# Patient Record
Sex: MALE | Race: WHITE | NOT HISPANIC OR LATINO | Employment: OTHER | URBAN - METROPOLITAN AREA
[De-identification: names, ages, dates, MRNs, and addresses within clinical notes are randomized per-mention and may not be internally consistent; named-entity substitution may affect disease eponyms.]

---

## 2017-02-02 ENCOUNTER — HISTORICAL (OUTPATIENT)
Dept: ADMINISTRATIVE | Facility: HOSPITAL | Age: 52
End: 2017-02-02

## 2017-02-02 LAB — GLUCOSE SERPL-MCNC: 99 MG/DL (ref 70–99)

## 2017-02-03 LAB
BUN SERPL-MCNC: 12 MG/DL (ref 8–20)
CALCIUM SERPL-MCNC: 9.3 MG/DL (ref 7.7–10.4)
CHLORIDE: 99 MMOL/L (ref 98–110)
CO2 SERPL-SCNC: 31 MMOL/L (ref 22.8–31.6)
CREATININE: 0.97 MG/DL (ref 0.6–1.4)
GLUCOSE: 94 MG/DL (ref 70–99)
INR PPP: 1
POTASSIUM SERPL-SCNC: 3.8 MMOL/L (ref 3.5–5)
PROTHROMBIN TIME: 12.8 SEC (ref 11.3–15.2)
SODIUM: 139 MMOL/L (ref 134–144)

## 2017-02-13 LAB
ALBUMIN SERPL-MCNC: 4.2 G/DL (ref 3.1–4.7)
ALP SERPL-CCNC: 78 IU/L (ref 40–104)
ALT (SGPT): 25 IU/L (ref 3–33)
AST SERPL-CCNC: 19 IU/L (ref 10–40)
BASOPHILS NFR BLD: 0 K/UL (ref 0–0.2)
BASOPHILS NFR BLD: 0.5 %
BILIRUB SERPL-MCNC: 0.7 MG/DL (ref 0.3–1)
BUN SERPL-MCNC: 18 MG/DL (ref 8–20)
CALCIUM SERPL-MCNC: 9.3 MG/DL (ref 7.7–10.4)
CHLORIDE: 102 MMOL/L (ref 98–110)
CO2 SERPL-SCNC: 29.9 MMOL/L (ref 22.8–31.6)
CREATININE: 0.96 MG/DL (ref 0.6–1.4)
EOSINOPHIL NFR BLD: 0.2 K/UL (ref 0–0.7)
EOSINOPHIL NFR BLD: 2.5 %
ERYTHROCYTE [DISTWIDTH] IN BLOOD BY AUTOMATED COUNT: 19.2 % (ref 12.5–14.5)
GLUCOSE: 93 MG/DL (ref 70–99)
GRAN #: 6.1 K/UL (ref 1.4–6.5)
GRAN%: 69.2 %
HCT VFR BLD AUTO: 32.9 % (ref 39–55)
HGB BLD-MCNC: 9 G/DL (ref 14–16)
IMMATURE GRANS (ABS): 0.1 K/UL (ref 0–1)
IMMATURE GRANULOCYTES: 1 %
LYMPH #: 1.6 K/UL (ref 1.2–3.4)
LYMPH%: 17.9 %
MCH RBC QN AUTO: 18.4 PG (ref 25–35)
MCHC RBC AUTO-ENTMCNC: 27.4 G/DL (ref 31–36)
MCV RBC AUTO: 67.4 FL (ref 80–100)
MONO #: 0.8 K/UL (ref 0.1–0.6)
MONO%: 8.9 %
NUCLEATED RBCS: 0 %
NUCLEATED RED BLOOD CELLS: 0 /100 WBC
PERFORMED BY:: ABNORMAL
PLATELET # BLD AUTO: 254 K/UL (ref 140–440)
PMV BLD AUTO: 9.2 FL (ref 7.4–10.4)
POTASSIUM SERPL-SCNC: 3.7 MMOL/L (ref 3.5–5)
PROT SERPL-MCNC: 7.7 G/DL (ref 6–8.2)
RBC # BLD AUTO: 4.88 M/UL (ref 4.3–5.9)
SODIUM: 139 MMOL/L (ref 134–144)
WBC # BLD: 8.8 K/UL (ref 5–10)

## 2017-02-27 LAB
ALBUMIN SERPL-MCNC: 4 G/DL (ref 3.1–4.7)
ALP SERPL-CCNC: 76 IU/L (ref 40–104)
ALT (SGPT): 22 IU/L (ref 3–33)
AST SERPL-CCNC: 17 IU/L (ref 10–40)
BASOPHILS NFR BLD: 0 K/UL (ref 0–0.2)
BASOPHILS NFR BLD: 0.5 %
BILIRUB SERPL-MCNC: 0.3 MG/DL (ref 0.3–1)
BUN SERPL-MCNC: 12 MG/DL (ref 8–20)
CALCIUM SERPL-MCNC: 9.1 MG/DL (ref 7.7–10.4)
CHLORIDE: 103 MMOL/L (ref 98–110)
CO2 SERPL-SCNC: 31.5 MMOL/L (ref 22.8–31.6)
CREATININE: 0.78 MG/DL (ref 0.6–1.4)
EOSINOPHIL NFR BLD: 0.2 K/UL (ref 0–0.7)
EOSINOPHIL NFR BLD: 3.7 %
ERYTHROCYTE [DISTWIDTH] IN BLOOD BY AUTOMATED COUNT: 20 % (ref 12.5–14.5)
GLUCOSE: 102 MG/DL (ref 70–99)
GRAN #: 3.9 K/UL (ref 1.4–6.5)
GRAN%: 68.8 %
HCT VFR BLD AUTO: 32.5 % (ref 39–55)
HGB BLD-MCNC: 9 G/DL (ref 14–16)
IMMATURE GRANS (ABS): 0 K/UL (ref 0–1)
IMMATURE GRANULOCYTES: 0.5 %
LYMPH #: 1 K/UL (ref 1.2–3.4)
LYMPH%: 18 %
MCH RBC QN AUTO: 18.8 PG (ref 25–35)
MCHC RBC AUTO-ENTMCNC: 27.7 G/DL (ref 31–36)
MCV RBC AUTO: 68 FL (ref 80–100)
MONO #: 0.5 K/UL (ref 0.1–0.6)
MONO%: 8.5 %
NUCLEATED RBCS: 0 %
NUCLEATED RED BLOOD CELLS: 0 /100 WBC
PERFORMED BY:: ABNORMAL
PLATELET # BLD AUTO: 178 K/UL (ref 140–440)
PMV BLD AUTO: 8.7 FL (ref 8.8–12.7)
POTASSIUM SERPL-SCNC: 3.8 MMOL/L (ref 3.5–5)
PROT SERPL-MCNC: 7.5 G/DL (ref 6–8.2)
RBC # BLD AUTO: 4.78 M/UL (ref 4.3–5.9)
SODIUM: 140 MMOL/L (ref 134–144)
WBC # BLD: 5.7 K/UL (ref 5–10)

## 2017-03-13 ENCOUNTER — HISTORICAL (OUTPATIENT)
Dept: ADMINISTRATIVE | Facility: HOSPITAL | Age: 52
End: 2017-03-13

## 2017-03-13 LAB
ALBUMIN SERPL-MCNC: 4.1 G/DL (ref 3.1–4.7)
ALP SERPL-CCNC: 84 IU/L (ref 40–104)
ALT (SGPT): 23 IU/L (ref 3–33)
AST SERPL-CCNC: 19 IU/L (ref 10–40)
BASOPHILS NFR BLD: 0 K/UL (ref 0–0.2)
BASOPHILS NFR BLD: 0.4 %
BILIRUB SERPL-MCNC: 0.4 MG/DL (ref 0.3–1)
BUN SERPL-MCNC: 9 MG/DL (ref 8–20)
CALCIUM SERPL-MCNC: 9.1 MG/DL (ref 7.7–10.4)
CHLORIDE: 105 MMOL/L (ref 98–110)
CO2 SERPL-SCNC: 27.5 MMOL/L (ref 22.8–31.6)
CREATININE: 0.86 MG/DL (ref 0.6–1.4)
EOSINOPHIL NFR BLD: 0.1 K/UL (ref 0–0.7)
EOSINOPHIL NFR BLD: 2.5 %
ERYTHROCYTE [DISTWIDTH] IN BLOOD BY AUTOMATED COUNT: 21.2 % (ref 12.5–14.5)
GLUCOSE: 110 MG/DL (ref 70–99)
GRAN #: 3.1 K/UL (ref 1.4–6.5)
GRAN%: 64.4 %
HCT VFR BLD AUTO: 33.3 % (ref 39–55)
HGB BLD-MCNC: 9.4 G/DL (ref 14–16)
IMMATURE GRANS (ABS): 0 K/UL (ref 0–1)
IMMATURE GRANULOCYTES: 0.4 %
LYMPH #: 1.1 K/UL (ref 1.2–3.4)
LYMPH%: 22.4 %
MCH RBC QN AUTO: 19.3 PG (ref 25–35)
MCHC RBC AUTO-ENTMCNC: 28.2 G/DL (ref 31–36)
MCV RBC AUTO: 68.4 FL (ref 80–100)
MONO #: 0.5 K/UL (ref 0.1–0.6)
MONO%: 9.9 %
NUCLEATED RBCS: 0 %
NUCLEATED RED BLOOD CELLS: 0 /100 WBC
PERFORMED BY:: ABNORMAL
PLATELET # BLD AUTO: 139 K/UL (ref 140–440)
PMV BLD AUTO: 8.3 FL (ref 8.8–12.7)
POTASSIUM SERPL-SCNC: 3.3 MMOL/L (ref 3.5–5)
PROT SERPL-MCNC: 7.5 G/DL (ref 6–8.2)
RBC # BLD AUTO: 4.87 M/UL (ref 4.3–5.9)
SODIUM: 140 MMOL/L (ref 134–144)
WBC # BLD: 4.9 K/UL (ref 5–10)

## 2017-04-10 LAB
ALBUMIN SERPL-MCNC: 3.7 G/DL (ref 3.1–4.7)
ALP SERPL-CCNC: 99 IU/L (ref 40–104)
ALT (SGPT): 14 IU/L (ref 3–33)
AST SERPL-CCNC: 18 IU/L (ref 10–40)
BASOPHILS NFR BLD: 0 K/UL (ref 0–0.2)
BASOPHILS NFR BLD: 0.6 %
BILIRUB SERPL-MCNC: 0.4 MG/DL (ref 0.3–1)
BUN SERPL-MCNC: 11 MG/DL (ref 8–20)
CALCIUM SERPL-MCNC: 9.3 MG/DL (ref 7.7–10.4)
CHLORIDE: 99 MMOL/L (ref 98–110)
CO2 SERPL-SCNC: 27.9 MMOL/L (ref 22.8–31.6)
CREATININE: 0.95 MG/DL (ref 0.6–1.4)
EOSINOPHIL NFR BLD: 0.2 K/UL (ref 0–0.7)
EOSINOPHIL NFR BLD: 2.7 %
ERYTHROCYTE [DISTWIDTH] IN BLOOD BY AUTOMATED COUNT: 21.7 % (ref 12.5–14.5)
GLUCOSE: 82 MG/DL (ref 70–99)
GRAN #: 4.8 K/UL (ref 1.4–6.5)
GRAN%: 66.6 %
HCT VFR BLD AUTO: 33 % (ref 39–55)
HGB BLD-MCNC: 9.3 G/DL (ref 14–16)
IMMATURE GRANS (ABS): 0.1 K/UL (ref 0–1)
IMMATURE GRANULOCYTES: 1 %
LYMPH #: 1.5 K/UL (ref 1.2–3.4)
LYMPH%: 20.4 %
MCH RBC QN AUTO: 20 PG (ref 25–35)
MCHC RBC AUTO-ENTMCNC: 28.2 G/DL (ref 31–36)
MCV RBC AUTO: 71 FL (ref 80–100)
MONO #: 0.6 K/UL (ref 0.1–0.6)
MONO%: 8.7 %
NUCLEATED RBCS: 0 %
NUCLEATED RED BLOOD CELLS: 0 /100 WBC
PERFORMED BY:: ABNORMAL
PLATELET # BLD AUTO: 194 K/UL (ref 140–440)
PMV BLD AUTO: 8.9 FL (ref 8.8–12.7)
POTASSIUM SERPL-SCNC: 3.9 MMOL/L (ref 3.5–5)
PROT SERPL-MCNC: 8.4 G/DL (ref 6–8.2)
RBC # BLD AUTO: 4.65 M/UL (ref 4.3–5.9)
SODIUM: 138 MMOL/L (ref 134–144)
WBC # BLD: 7.2 K/UL (ref 5–10)

## 2017-04-24 ENCOUNTER — HISTORICAL (OUTPATIENT)
Dept: ADMINISTRATIVE | Facility: HOSPITAL | Age: 52
End: 2017-04-24

## 2017-04-26 LAB
ALBUMIN SERPL-MCNC: 3.8 G/DL (ref 3.1–4.7)
ALP SERPL-CCNC: 75 IU/L (ref 40–104)
ALT (SGPT): 17 IU/L (ref 3–33)
AST SERPL-CCNC: 18 IU/L (ref 10–40)
BASOPHILS NFR BLD: 0 K/UL (ref 0–0.2)
BASOPHILS NFR BLD: 0.5 %
BILIRUB SERPL-MCNC: 0.3 MG/DL (ref 0.3–1)
BUN SERPL-MCNC: 12 MG/DL (ref 8–20)
CALCIUM SERPL-MCNC: 9 MG/DL (ref 7.7–10.4)
CHLORIDE: 103 MMOL/L (ref 98–110)
CO2 SERPL-SCNC: 28.8 MMOL/L (ref 22.8–31.6)
CREATININE: 0.93 MG/DL (ref 0.6–1.4)
EOSINOPHIL NFR BLD: 0.2 K/UL (ref 0–0.7)
EOSINOPHIL NFR BLD: 5.5 %
ERYTHROCYTE [DISTWIDTH] IN BLOOD BY AUTOMATED COUNT: 21.9 % (ref 12.5–14.5)
GLUCOSE: 127 MG/DL (ref 70–99)
GRAN #: 1.5 K/UL (ref 1.4–6.5)
GRAN%: 40.2 %
HCT VFR BLD AUTO: 29.5 % (ref 39–55)
HGB BLD-MCNC: 8.5 G/DL (ref 14–16)
IMMATURE GRANS (ABS): 0 K/UL (ref 0–1)
IMMATURE GRANULOCYTES: 0.8 %
LYMPH #: 1.5 K/UL (ref 1.2–3.4)
LYMPH%: 41.3 %
MCH RBC QN AUTO: 20.3 PG (ref 25–35)
MCHC RBC AUTO-ENTMCNC: 28.8 G/DL (ref 31–36)
MCV RBC AUTO: 70.4 FL (ref 80–100)
MONO #: 0.4 K/UL (ref 0.1–0.6)
MONO%: 11.7 %
NUCLEATED RBCS: 0 %
NUCLEATED RED BLOOD CELLS: 0 /100 WBC
PERFORMED BY:: ABNORMAL
PLATELET # BLD AUTO: 184 K/UL (ref 140–440)
PMV BLD AUTO: 8.5 FL (ref 8.8–12.7)
POTASSIUM SERPL-SCNC: 3 MMOL/L (ref 3.5–5)
PROT SERPL-MCNC: 7.5 G/DL (ref 6–8.2)
RBC # BLD AUTO: 4.19 M/UL (ref 4.3–5.9)
SODIUM: 138 MMOL/L (ref 134–144)
WBC # BLD: 3.7 K/UL (ref 5–10)

## 2017-05-08 LAB
ALBUMIN SERPL-MCNC: 3.7 G/DL (ref 3.1–4.7)
ALP SERPL-CCNC: 79 IU/L (ref 40–104)
ALT (SGPT): 17 IU/L (ref 3–33)
AST SERPL-CCNC: 14 IU/L (ref 10–40)
BASOPHILS NFR BLD: 0 K/UL (ref 0–0.2)
BASOPHILS NFR BLD: 0.4 %
BILIRUB SERPL-MCNC: 0.3 MG/DL (ref 0.3–1)
BUN SERPL-MCNC: 13 MG/DL (ref 8–20)
CALCIUM SERPL-MCNC: 9.2 MG/DL (ref 7.7–10.4)
CHLORIDE: 103 MMOL/L (ref 98–110)
CO2 SERPL-SCNC: 29.6 MMOL/L (ref 22.8–31.6)
CREATININE: 1.07 MG/DL (ref 0.6–1.4)
EOSINOPHIL NFR BLD: 0.1 K/UL (ref 0–0.7)
EOSINOPHIL NFR BLD: 1.3 %
ERYTHROCYTE [DISTWIDTH] IN BLOOD BY AUTOMATED COUNT: 21.2 % (ref 12.5–14.5)
GLUCOSE: 97 MG/DL (ref 70–99)
GRAN #: 2.9 K/UL (ref 1.4–6.5)
GRAN%: 61.7 %
HCT VFR BLD AUTO: 29.5 % (ref 39–55)
HGB BLD-MCNC: 8.6 G/DL (ref 14–16)
IMMATURE GRANS (ABS): 0 K/UL (ref 0–1)
IMMATURE GRANULOCYTES: 0.4 %
LYMPH #: 1.2 K/UL (ref 1.2–3.4)
LYMPH%: 26 %
MCH RBC QN AUTO: 20.9 PG (ref 25–35)
MCHC RBC AUTO-ENTMCNC: 29.2 G/DL (ref 31–36)
MCV RBC AUTO: 71.6 FL (ref 80–100)
MONO #: 0.5 K/UL (ref 0.1–0.6)
MONO%: 10.2 %
NUCLEATED RBCS: 0 %
NUCLEATED RED BLOOD CELLS: 0 /100 WBC
PERFORMED BY:: ABNORMAL
PLATELET # BLD AUTO: 142 K/UL (ref 140–440)
PMV BLD AUTO: 8.8 FL (ref 8.8–12.7)
POTASSIUM SERPL-SCNC: 4 MMOL/L (ref 3.5–5)
PROT SERPL-MCNC: 7 G/DL (ref 6–8.2)
RBC # BLD AUTO: 4.12 M/UL (ref 4.3–5.9)
SODIUM: 140 MMOL/L (ref 134–144)
WBC # BLD: 4.7 K/UL (ref 5–10)

## 2017-05-10 PROBLEM — C18.9 COLON CANCER: Status: ACTIVE | Noted: 2017-05-10

## 2017-05-10 PROBLEM — I82.C19 THROMBOSIS OF INTERNAL JUGULAR VEIN: Status: ACTIVE | Noted: 2017-05-10

## 2017-05-10 PROBLEM — I80.8 LEMIERRE SYNDROME: Status: ACTIVE | Noted: 2017-05-10

## 2017-05-10 PROBLEM — I26.99 ACUTE PULMONARY EMBOLISM: Status: ACTIVE | Noted: 2017-05-10

## 2017-05-10 PROBLEM — J02.9 LEMIERRE SYNDROME: Status: ACTIVE | Noted: 2017-05-10

## 2017-05-10 PROBLEM — R65.20 SEVERE SEPSIS: Status: ACTIVE | Noted: 2017-05-10

## 2017-05-10 PROBLEM — A41.9 SEVERE SEPSIS: Status: ACTIVE | Noted: 2017-05-10

## 2017-05-10 PROBLEM — D61.810 PANCYTOPENIA DUE TO ANTINEOPLASTIC CHEMOTHERAPY: Status: ACTIVE | Noted: 2017-05-10

## 2017-05-10 PROBLEM — T45.1X5A PANCYTOPENIA DUE TO ANTINEOPLASTIC CHEMOTHERAPY: Status: ACTIVE | Noted: 2017-05-10

## 2017-05-10 PROBLEM — E66.9 OBESITY (BMI 30-39.9): Status: ACTIVE | Noted: 2017-05-10

## 2017-05-11 PROBLEM — F17.200 TOBACCO USE DISORDER: Status: ACTIVE | Noted: 2017-05-11

## 2017-05-16 ENCOUNTER — OFFICE VISIT (OUTPATIENT)
Dept: HEMATOLOGY/ONCOLOGY | Facility: CLINIC | Age: 52
End: 2017-05-16
Payer: COMMERCIAL

## 2017-05-16 VITALS
HEART RATE: 82 BPM | WEIGHT: 278.81 LBS | TEMPERATURE: 98 F | SYSTOLIC BLOOD PRESSURE: 115 MMHG | DIASTOLIC BLOOD PRESSURE: 79 MMHG | BODY MASS INDEX: 37.76 KG/M2 | HEIGHT: 72 IN | RESPIRATION RATE: 16 BRPM

## 2017-05-16 DIAGNOSIS — D61.810 PANCYTOPENIA DUE TO ANTINEOPLASTIC CHEMOTHERAPY: ICD-10-CM

## 2017-05-16 DIAGNOSIS — C18.7 MALIGNANT NEOPLASM OF SIGMOID COLON: Primary | Chronic | ICD-10-CM

## 2017-05-16 DIAGNOSIS — R65.20 SEVERE SEPSIS: ICD-10-CM

## 2017-05-16 DIAGNOSIS — T45.1X5A PANCYTOPENIA DUE TO ANTINEOPLASTIC CHEMOTHERAPY: ICD-10-CM

## 2017-05-16 DIAGNOSIS — T45.1X5A NEUROPATHY DUE TO CHEMOTHERAPEUTIC DRUG: ICD-10-CM

## 2017-05-16 DIAGNOSIS — I26.99 OTHER ACUTE PULMONARY EMBOLISM WITHOUT ACUTE COR PULMONALE: ICD-10-CM

## 2017-05-16 DIAGNOSIS — G62.0 NEUROPATHY DUE TO CHEMOTHERAPEUTIC DRUG: ICD-10-CM

## 2017-05-16 DIAGNOSIS — I82.C11 THROMBOSIS OF INTERNAL JUGULAR VEIN, RIGHT: ICD-10-CM

## 2017-05-16 DIAGNOSIS — A41.9 SEVERE SEPSIS: ICD-10-CM

## 2017-05-16 PROCEDURE — 99215 OFFICE O/P EST HI 40 MIN: CPT | Mod: ,,, | Performed by: INTERNAL MEDICINE

## 2017-05-16 RX ORDER — SODIUM CHLORIDE 0.9 % (FLUSH) 0.9 %
10 SYRINGE (ML) INJECTION
Status: CANCELLED | OUTPATIENT
Start: 2017-06-05

## 2017-05-16 RX ORDER — PROMETHAZINE HYDROCHLORIDE 25 MG/1
25 TABLET ORAL EVERY 6 HOURS
Refills: 0 | COMMUNITY
Start: 2017-02-13 | End: 2018-07-17

## 2017-05-16 RX ORDER — HEPARIN 100 UNIT/ML
500 SYRINGE INTRAVENOUS
Status: CANCELLED | OUTPATIENT
Start: 2017-06-05

## 2017-05-16 RX ORDER — ONDANSETRON HYDROCHLORIDE 8 MG/1
8 TABLET, FILM COATED ORAL EVERY 8 HOURS
Refills: 0 | COMMUNITY
Start: 2017-02-13 | End: 2018-07-17

## 2017-05-16 NOTE — PROGRESS NOTES
PROGRESS NOTE    Subjective:       Patient ID: Faisal Aguirre III is a 51 y.o. male.    Chief Complaint:  Colon Cancer; Anemia; and Vascular Access Problem (recent port infection, admitted to [facility])      History of Present Illness:   Faisal Aguirre III is a 51 y.o. male who presents with a history of stage 3 colon cancer s/p 6th cycle of Folfox and developed pain on the right neck in port area.  Was sent to ED and transferred to [facility] for ENT eval.  Patient found to have right IJ thrombus, PE and required removal again of port.  Refuses placement of a new port.  Is now on Eliquis and antibiotics and feeling much better.         Family and Social history reviewed and is unchanged from last vist.       ROS:  Review of Systems   Constitutional: Negative for fever and unexpected weight change.   HENT: Negative for nosebleeds.    Respiratory: Negative for chest tightness and shortness of breath.    Cardiovascular: Negative for chest pain.   Gastrointestinal: Negative for abdominal pain and blood in stool.   Genitourinary: Negative for hematuria.   Skin: Negative for rash.   Hematological: Does not bruise/bleed easily.          Current Outpatient Prescriptions:     AMLODIPINE BESYLATE, BULK, MISC, Take 10 mg by mouth once daily at 6am. , Disp: , Rfl:     apixaban 5 mg Tab, Take 1 tablet (5 mg total) by mouth 2 (two) times daily., Disp: 60 tablet, Rfl: 0    clindamycin (CLEOCIN) 300 MG capsule, Take 1 capsule (300 mg total) by mouth every 8 (eight) hours., Disp: 15 capsule, Rfl: 0    dronabinol (MARINOL) 10 MG capsule, Take 10 mg by mouth 2 (two) times daily before meals., Disp: , Rfl:     finasteride (PROSCAR) 5 mg tablet, Take 5 mg by mouth once daily. , Disp: , Rfl:     furosemide (LASIX) 40 MG tablet, Take 40 mg by mouth once daily. , Disp: , Rfl:     lactobacillus acidophilus & bulgar (LACTINEX) 100 million cell packet, Take 1 packet (1 each  total) by mouth 2 (two) times daily., Disp: , Rfl:     omeprazole (PRILOSEC) 20 MG capsule, Take 20 mg by mouth once daily. , Disp: , Rfl:     ondansetron (ZOFRAN) 8 MG tablet, Take 8 mg by mouth every 8 (eight) hours., Disp: , Rfl: 0    potassium chloride (MICRO-K) 10 MEQ CpSR, Take 20 mEq by mouth once daily. , Disp: , Rfl:     promethazine (PHENERGAN) 25 MG tablet, Take 25 mg by mouth every 6 (six) hours., Disp: , Rfl: 0    tamsulosin (FLOMAX) 0.4 mg Cp24, Take 0.4 mg by mouth once daily. , Disp: , Rfl:     VENLAFAXINE HCL (VENLAFAXINE ORAL), Take 150 mg by mouth once daily at 6am. , Disp: , Rfl:         Objective:       Physical Examination:     /79  Pulse 82  Temp 97.8 °F (36.6 °C)  Resp 16  Ht 6' (1.829 m)  Wt 126.5 kg (278 lb 12.8 oz)  BMI 37.81 kg/m2    Physical Exam   Constitutional: He is oriented to person, place, and time. He appears well-developed and well-nourished.   HENT:   Head: Normocephalic and atraumatic.   Right Ear: External ear normal.   Left Ear: External ear normal.   Mouth/Throat: Oropharynx is clear and moist.   Eyes: Conjunctivae are normal. Pupils are equal, round, and reactive to light. No scleral icterus.   Neck: Normal range of motion. Neck supple.   Cardiovascular: Normal rate, regular rhythm and normal heart sounds.  Exam reveals no gallop and no friction rub.    No murmur heard.  Pulmonary/Chest: Effort normal and breath sounds normal. No respiratory distress. He has no rales. He exhibits no tenderness.   Abdominal: Soft. Bowel sounds are normal. He exhibits no distension and no mass. There is no hepatosplenomegaly. There is no tenderness. There is no rebound and no guarding.   Musculoskeletal: He exhibits no edema.   Lymphadenopathy:        Head (right side): No tonsillar adenopathy present.        Head (left side): No tonsillar adenopathy present.     He has no cervical adenopathy.     He has no axillary adenopathy.        Right: No supraclavicular adenopathy  present.        Left: No supraclavicular adenopathy present.   Neurological: He is alert and oriented to person, place, and time.   Psychiatric: He has a normal mood and affect. His behavior is normal. Judgment and thought content normal.   Vitals reviewed.      Labs:   Recent Results (from the past 336 hour(s))   CBC auto differential    Collection Time: 05/11/17  3:34 AM   Result Value Ref Range    WBC 2.56 (L) 3.90 - 12.70 K/uL    Hemoglobin 8.9 (L) 14.0 - 18.0 g/dL    Hematocrit 29.9 (L) 40.0 - 54.0 %    Platelets 131 (L) 150 - 350 K/uL   CBC auto differential    Collection Time: 05/10/17  8:32 PM   Result Value Ref Range    WBC 3.61 (L) 3.90 - 12.70 K/uL    Hemoglobin 9.0 (L) 14.0 - 18.0 g/dL    Hematocrit 30.3 (L) 40.0 - 54.0 %    Platelets 130 (L) 150 - 350 K/uL   CBC auto differential    Collection Time: 05/08/17  8:20 AM   Result Value Ref Range    WBC 4.7 (L) 5.0 - 10.0 K/ul    Hemoglobin 8.6 (L) 14.0 - 16.0 g/dl    Hematocrit 29.5 (L) 39.0 - 55.0 %    Platelets 142 140 - 440 K/ul     CMP  Sodium   Date Value Ref Range Status   05/11/2017 140 136 - 145 mmol/L Final   05/08/2017 140 134 - 144 mmol/L      Potassium   Date Value Ref Range Status   05/11/2017 4.3 3.5 - 5.1 mmol/L Final     Chloride   Date Value Ref Range Status   05/11/2017 104 95 - 110 mmol/L Final   05/08/2017 103 98 - 110 mmol/L      CO2   Date Value Ref Range Status   05/11/2017 25 22 - 31 mmol/L Final     Glucose   Date Value Ref Range Status   05/11/2017 158 (H) 70 - 110 mg/dL Final     Comment:     The ADA recommends the following guidelines for fasting glucose:  Normal:       less than 100 mg/dL  Prediabetes:  100 mg/dL to 125 mg/dL  Diabetes:     126 mg/dL or higher     05/08/2017 97 70 - 99 mg/dL      BUN, Bld   Date Value Ref Range Status   05/11/2017 8 (L) 9 - 21 mg/dL Final     Creatinine   Date Value Ref Range Status   05/11/2017 0.68 0.50 - 1.40 mg/dL Final   05/08/2017 1.07 0.60 - 1.40 mg/dL      Calcium   Date Value Ref Range  Status   05/11/2017 9.0 8.4 - 10.2 mg/dL Final     Total Protein   Date Value Ref Range Status   05/11/2017 7.4 6.0 - 8.4 g/dL Final     Albumin   Date Value Ref Range Status   05/11/2017 4.1 3.5 - 5.2 g/dL Final   05/08/2017 3.7 3.1 - 4.7 g/dL      Total Bilirubin   Date Value Ref Range Status   05/11/2017 0.6 0.2 - 1.3 mg/dL Final     Comment:     For infants and newborns, interpretation of results should be based  on gestational age, weight and in agreement with clinical  observations.  Premature Infant recommended reference ranges:  Up to 24 hours.............<8.0 mg/dL  Up to 48 hours............<12.0 mg/dL  3-5 days..................<15.0 mg/dL  6-29 days.................<15.0 mg/dL       Alkaline Phosphatase   Date Value Ref Range Status   05/11/2017 84 38 - 145 U/L Final     AST   Date Value Ref Range Status   05/11/2017 33 17 - 59 U/L Final     ALT   Date Value Ref Range Status   05/11/2017 50 (H) 10 - 44 U/L Final     Anion Gap   Date Value Ref Range Status   05/11/2017 11 8 - 16 mmol/L Final     eGFR if    Date Value Ref Range Status   05/11/2017 >60 >60 mL/min/1.73 m^2 Final     eGFR if non    Date Value Ref Range Status   05/11/2017 >60 >60 mL/min/1.73 m^2 Final     Comment:     Calculation used to obtain the estimated glomerular filtration  rate (eGFR) is the CKD-EPI equation. Since race is unknown   in our information system, the eGFR values for   -American and Non--American patients are given   for each creatinine result.       No results found for: CEA  No results found for: PSA        Assessment/Plan:   Malignant neoplasm of sigmoid colon  Comments:  Will d/e 5FU pump and replace with Xelox regimen for the next six cycles.  Discussed in detail with pt.  He refuses further port. Will begin June 5th.     Other acute pulmonary embolism without acute cor pulmonale  Comments:  Continue on Eliquis.  Patient doing well.  no sob.  improved breathing.     Severe  sepsis  Comments:  recovering.  continue antibiotics.      Thrombosis of internal jugular vein, right  Comments:  Continue Eliquis.     Pancytopenia due to antineoplastic chemotherapy  Comments:  Continue to observe for now.      Neuropathy due to chemotherapeutic drug  Comments:  low grade, watch closely.         Discussion:     I have spent greater than 40 minutes in the care of this patient discussing the issues reflected in the assessment and plan.  Greater than 50% face to face.  All questions answered to the patients satisfaction.    Electronically signed by Delvin Martinez

## 2017-05-16 NOTE — MR AVS SNAPSHOT
[facility]  [address]  [address]  [place] [address]  Phone: [phone]  Fax: [phone]                  Faisal Aguirre III   2017 9:15 AM   Office Visit    Description:  Male : 1965   Provider:  Delvin Rainey MD   Department:  [facility]           Reason for Visit     Colon Cancer     Anemia     Vascular Access Problem           Diagnoses this Visit        Comments    Malignant neoplasm of sigmoid colon    -  Primary Will d/e 5FU pump and replace with Xelox regimen for the next six cycles.  Discussed in detail with pt.  He refuses further port. Will begin .     Other acute pulmonary embolism without acute cor pulmonale     Continue on Eliquis.  Patient doing well.  no sob.  improved breathing.     Severe sepsis     recovering.  continue antibiotics.      Thrombosis of internal jugular vein, right     Continue Eliquis.     Pancytopenia due to antineoplastic chemotherapy     Continue to observe for now.      Neuropathy due to chemotherapeutic drug     low grade, watch closely.            To Do List           Future Appointments        Provider Department Dept Phone    2017 11:00 AM Delvin Rainey MD [facility] [phone]      Goals (5 Years of Data)     None      Follow-Up and Disposition     Return in about 5 weeks (around 2017).    Follow-up and Disposition History           Medications                Verify that the below list of medications is an accurate representation of the medications you are currently taking.  If none reported, the list may be blank. If incorrect, please contact your healthcare provider. Carry this list with you in case of emergency.           Current Medications     AMLODIPINE BESYLATE, BULK, MISC Take 10 mg by mouth once daily at 6am.     apixaban 5 mg Tab Take 1 tablet (5 mg total) by mouth 2 (two) times daily.    clindamycin (CLEOCIN) 300 MG  capsule Take 1 capsule (300 mg total) by mouth every 8 (eight) hours.    dronabinol (MARINOL) 10 MG capsule Take 10 mg by mouth 2 (two) times daily before meals.    finasteride (PROSCAR) 5 mg tablet Take 5 mg by mouth once daily.     furosemide (LASIX) 40 MG tablet Take 40 mg by mouth once daily.     lactobacillus acidophilus & bulgar (LACTINEX) 100 million cell packet Take 1 packet (1 each total) by mouth 2 (two) times daily.    omeprazole (PRILOSEC) 20 MG capsule Take 20 mg by mouth once daily.     ondansetron (ZOFRAN) 8 MG tablet Take 8 mg by mouth every 8 (eight) hours.    potassium chloride (MICRO-K) 10 MEQ CpSR Take 20 mEq by mouth once daily.     promethazine (PHENERGAN) 25 MG tablet Take 25 mg by mouth every 6 (six) hours.    tamsulosin (FLOMAX) 0.4 mg Cp24 Take 0.4 mg by mouth once daily.     VENLAFAXINE HCL (VENLAFAXINE ORAL) Take 150 mg by mouth once daily at 6am.            Clinical Reference Information           Your Vitals Were     BP Pulse Temp Resp Height Weight    115/79 82 97.8 °F (36.6 °C) 16 6' (1.829 m) 126.5 kg (278 lb 12.8 oz)    BMI                37.81 kg/m2          Blood Pressure          Most Recent Value    BP  115/79      Allergies as of 5/16/2017     Pcn [Penicillins]      Immunizations Administered on Date of Encounter - 5/16/2017     None      [facility] Sign UP     Activating your [facility] account is as easy as 1-2-3!     1) Visit [email or web address], select Sign Up Now, enter this activation code and your date of birth, then select Next.  [email or web address]  Expires: 6/27/2017 10:01 AM      2) Create a username and password to use when you visit [facility] in the future and select a security question in case you lose your password and select Next.    3) Enter your e-mail address and click Sign Up!    Additional Information  If you have questions, please  call [phone] to talk to our [facility] staff. Remember, [facility] is NOT to be used for urgent needs. For medical emergencies, dial  911.

## 2017-05-17 PROBLEM — E07.9 DISEASE OF THYROID GLAND: Status: ACTIVE | Noted: 2017-05-17

## 2017-05-17 PROBLEM — D37.8 NEOPLASM OF UNCERTAIN BEHAVIOR OF STOMACH, INTESTINES, AND RECTUM: Status: ACTIVE | Noted: 2017-05-17

## 2017-05-17 PROBLEM — D37.5 NEOPLASM OF UNCERTAIN BEHAVIOR OF STOMACH, INTESTINES, AND RECTUM: Status: ACTIVE | Noted: 2017-05-17

## 2017-05-17 PROBLEM — E78.5 HYPERLIPIDEMIA: Status: ACTIVE | Noted: 2017-05-17

## 2017-05-17 PROBLEM — D37.1 NEOPLASM OF UNCERTAIN BEHAVIOR OF STOMACH, INTESTINES, AND RECTUM: Status: ACTIVE | Noted: 2017-05-17

## 2017-05-17 PROBLEM — F31.9 BIPOLAR AFFECTIVE DISORDER: Status: ACTIVE | Noted: 2017-05-17

## 2017-05-17 PROBLEM — I10 HYPERTENSION: Status: ACTIVE | Noted: 2017-05-17

## 2017-05-17 PROBLEM — K21.9 GASTROESOPHAGEAL REFLUX DISEASE: Status: ACTIVE | Noted: 2017-05-17

## 2017-05-17 PROBLEM — E66.9 OBESITY WITH BODY MASS INDEX 30 OR GREATER: Status: ACTIVE | Noted: 2017-05-17

## 2017-05-17 PROBLEM — F17.200 CURRENT SMOKER: Status: ACTIVE | Noted: 2017-05-17

## 2017-05-17 RX ORDER — AMLODIPINE BESYLATE 10 MG/1
TABLET ORAL
COMMUNITY
Start: 2017-03-30

## 2017-05-17 RX ORDER — FINASTERIDE 5 MG/1
TABLET, FILM COATED ORAL
COMMUNITY
Start: 2017-03-30

## 2017-05-17 RX ORDER — VENLAFAXINE HYDROCHLORIDE 75 MG/1
CAPSULE, EXTENDED RELEASE ORAL
COMMUNITY
Start: 2017-04-27

## 2017-05-17 RX ORDER — SIMVASTATIN 40 MG/1
TABLET, FILM COATED ORAL
COMMUNITY
Start: 2017-03-30

## 2017-05-17 RX ORDER — DRONABINOL 5 MG/1
CAPSULE ORAL
COMMUNITY
Start: 2017-04-11 | End: 2018-07-17

## 2017-05-23 ENCOUNTER — TELEPHONE (OUTPATIENT)
Dept: HEMATOLOGY/ONCOLOGY | Facility: CLINIC | Age: 52
End: 2017-05-23

## 2017-05-23 NOTE — TELEPHONE ENCOUNTER
Ms jesse called in [facility] called said they received RX for this patient and has questions about the dosage please return her call.

## 2017-05-23 NOTE — TELEPHONE ENCOUNTER
Spoke with Dr. Martinez r/g rx sent to [facility] for pts.  Xeloda. # 40 was ordered and dose is 5 tabs BID X 14 days. Correct # should be 140. Corrected and faxed to [facility].

## 2017-06-05 ENCOUNTER — HISTORICAL (OUTPATIENT)
Dept: ADMINISTRATIVE | Facility: HOSPITAL | Age: 52
End: 2017-06-05

## 2017-06-05 LAB
ALBUMIN SERPL-MCNC: 4.2 G/DL (ref 3.1–4.7)
ALP SERPL-CCNC: 100 IU/L (ref 40–104)
ALT (SGPT): 28 IU/L (ref 3–33)
AST SERPL-CCNC: 26 IU/L (ref 10–40)
BASOPHILS NFR BLD: 0.1 K/UL (ref 0–0.2)
BASOPHILS NFR BLD: 0.6 %
BILIRUB SERPL-MCNC: 0.5 MG/DL (ref 0.3–1)
BUN SERPL-MCNC: 19 MG/DL (ref 8–20)
CALCIUM SERPL-MCNC: 9.2 MG/DL (ref 7.7–10.4)
CHLORIDE: 104 MMOL/L (ref 98–110)
CO2 SERPL-SCNC: 26.3 MMOL/L (ref 22.8–31.6)
CREATININE: 0.96 MG/DL (ref 0.6–1.4)
EOSINOPHIL NFR BLD: 0.2 K/UL (ref 0–0.7)
EOSINOPHIL NFR BLD: 1.9 %
ERYTHROCYTE [DISTWIDTH] IN BLOOD BY AUTOMATED COUNT: 21.4 % (ref 12.5–14.5)
GLUCOSE: 107 MG/DL (ref 70–99)
GRAN #: 7.7 K/UL (ref 1.4–6.5)
GRAN%: 71.6 %
HCT VFR BLD AUTO: 36.2 % (ref 39–55)
HGB BLD-MCNC: 10.7 G/DL (ref 14–16)
IMMATURE GRANS (ABS): 0.1 K/UL (ref 0–1)
IMMATURE GRANULOCYTES: 1.3 %
LYMPH #: 1.9 K/UL (ref 1.2–3.4)
LYMPH%: 17.5 %
MCH RBC QN AUTO: 21.5 PG (ref 25–35)
MCHC RBC AUTO-ENTMCNC: 29.6 G/DL (ref 31–36)
MCV RBC AUTO: 72.7 FL (ref 80–100)
MONO #: 0.8 K/UL (ref 0.1–0.6)
MONO%: 7.1 %
NUCLEATED RBCS: 0 %
NUCLEATED RED BLOOD CELLS: 0 /100 WBC
PERFORMED BY:: ABNORMAL
PLATELET # BLD AUTO: 235 K/UL (ref 140–440)
PMV BLD AUTO: 9.3 FL (ref 8.8–12.7)
POTASSIUM SERPL-SCNC: 3.8 MMOL/L (ref 3.5–5)
PROT SERPL-MCNC: 8.1 G/DL (ref 6–8.2)
RBC # BLD AUTO: 4.98 M/UL (ref 4.3–5.9)
SODIUM: 140 MMOL/L (ref 134–144)
WBC # BLD: 10.7 K/UL (ref 5–10)

## 2017-06-20 ENCOUNTER — OFFICE VISIT (OUTPATIENT)
Dept: HEMATOLOGY/ONCOLOGY | Facility: CLINIC | Age: 52
End: 2017-06-20
Payer: COMMERCIAL

## 2017-06-20 VITALS
HEART RATE: 80 BPM | HEIGHT: 72 IN | DIASTOLIC BLOOD PRESSURE: 89 MMHG | BODY MASS INDEX: 38.72 KG/M2 | SYSTOLIC BLOOD PRESSURE: 141 MMHG | RESPIRATION RATE: 18 BRPM | WEIGHT: 285.88 LBS | TEMPERATURE: 98 F

## 2017-06-20 DIAGNOSIS — C18.7 MALIGNANT NEOPLASM OF SIGMOID COLON: Chronic | ICD-10-CM

## 2017-06-20 PROCEDURE — 99214 OFFICE O/P EST MOD 30 MIN: CPT | Mod: ,,, | Performed by: INTERNAL MEDICINE

## 2017-06-20 NOTE — PROGRESS NOTES
PROGRESS NOTE    Subjective:       Patient ID: Faisal Aguirre III is a 51 y.o. male.    Chief Complaint:  No chief complaint on file.      History of Present Illness:   Faisal Aguirre III is a 51 y.o. male who presents with a history of colon cancer.   Patient doing ok on Xeloda.  Began June 5th with Oxaliplatin.  Doing well.        Family and Social history reviewed and is unchanged from 1/25/2017.       ROS:  Review of Systems   Constitutional: Negative for fever and unexpected weight change.   HENT: Negative for nosebleeds.    Respiratory: Negative for chest tightness and shortness of breath.    Cardiovascular: Negative for chest pain.   Gastrointestinal: Negative for abdominal pain and blood in stool.   Genitourinary: Negative for hematuria.   Skin: Negative for rash.   Hematological: Does not bruise/bleed easily.          Current Outpatient Prescriptions:     amlodipine (NORVASC) 10 MG tablet, , Disp: , Rfl:     AMLODIPINE BESYLATE, BULK, MISC, Take 10 mg by mouth once daily at 6am. , Disp: , Rfl:     dronabinol (MARINOL) 10 MG capsule, Take 10 mg by mouth once daily., Disp: , Rfl:     finasteride (PROSCAR) 5 mg tablet, , Disp: , Rfl:     furosemide (LASIX) 40 MG tablet, Take 40 mg by mouth once daily. , Disp: , Rfl:     lactobacillus acidophilus & bulgar (LACTINEX) 100 million cell packet, Take 1 packet (1 each total) by mouth 2 (two) times daily., Disp: , Rfl:     MARINOL 5 mg capsule, , Disp: , Rfl:     omeprazole (PRILOSEC) 20 MG capsule, Take 20 mg by mouth once daily. , Disp: , Rfl:     ondansetron (ZOFRAN) 8 MG tablet, Take 8 mg by mouth every 8 (eight) hours., Disp: , Rfl: 0    potassium chloride (MICRO-K) 10 MEQ CpSR, Take 20 mEq by mouth once daily. , Disp: , Rfl:     promethazine (PHENERGAN) 25 MG tablet, Take 25 mg by mouth every 6 (six) hours., Disp: , Rfl: 0    simvastatin (ZOCOR) 40 MG tablet, , Disp: , Rfl:     tamsulosin  (FLOMAX) 0.4 mg Cp24, Take 0.4 mg by mouth once daily. , Disp: , Rfl:     venlafaxine (EFFEXOR-XR) 75 MG 24 hr capsule, , Disp: , Rfl:     VENLAFAXINE HCL (VENLAFAXINE ORAL), Take 150 mg by mouth once daily at 6am. , Disp: , Rfl:         Objective:       Physical Examination:     BP (!) 141/89   Pulse 80   Temp 98.1 °F (36.7 °C)   Resp 18   Ht 6' (1.829 m)   Wt 129.7 kg (285 lb 14.4 oz)   BMI 38.78 kg/m²     Physical Exam   Constitutional: He is oriented to person, place, and time. He appears well-developed and well-nourished.   HENT:   Head: Normocephalic and atraumatic.   Right Ear: External ear normal.   Left Ear: External ear normal.   Mouth/Throat: Oropharynx is clear and moist.   Eyes: Conjunctivae are normal. Pupils are equal, round, and reactive to light. No scleral icterus.   Neck: Normal range of motion. Neck supple.   Cardiovascular: Normal rate, regular rhythm and normal heart sounds.  Exam reveals no gallop and no friction rub.    No murmur heard.  Pulmonary/Chest: Effort normal and breath sounds normal. No respiratory distress. He has no rales. He exhibits no tenderness.   Abdominal: Soft. Bowel sounds are normal. He exhibits no distension and no mass. There is no hepatosplenomegaly. There is no tenderness. There is no rebound and no guarding.   Musculoskeletal: He exhibits no edema.   Lymphadenopathy:        Head (right side): No tonsillar adenopathy present.        Head (left side): No tonsillar adenopathy present.     He has no cervical adenopathy.     He has no axillary adenopathy.        Right: No supraclavicular adenopathy present.        Left: No supraclavicular adenopathy present.   Neurological: He is alert and oriented to person, place, and time.   Psychiatric: He has a normal mood and affect. His behavior is normal. Judgment and thought content normal.   Vitals reviewed.      Labs:   No results found for this or any previous visit (from the past 336 hour(s)).  CMP  Sodium   Date  Value Ref Range Status   06/05/2017 140 134 - 144 mmol/L      Potassium   Date Value Ref Range Status   06/05/2017 3.8 3.5 - 5.0 mmol/L      Chloride   Date Value Ref Range Status   06/05/2017 104 98 - 110 mmol/L      CO2   Date Value Ref Range Status   06/05/2017 26.3 22.8 - 31.6 mmol/L      Glucose   Date Value Ref Range Status   06/05/2017 107 (H) 70 - 99 mg/dL      BUN, Bld   Date Value Ref Range Status   06/05/2017 19 8 - 20 mg/dL      Creatinine   Date Value Ref Range Status   06/05/2017 0.96 0.60 - 1.40 mg/dL      Calcium   Date Value Ref Range Status   06/05/2017 9.2 7.7 - 10.4 mg/dL      Total Protein   Date Value Ref Range Status   06/05/2017 8.1 6.0 - 8.2 g/dL      Albumin   Date Value Ref Range Status   06/05/2017 4.2 3.1 - 4.7 g/dL      Total Bilirubin   Date Value Ref Range Status   06/05/2017 0.5 0.3 - 1.0 mg/dL      Alkaline Phosphatase   Date Value Ref Range Status   06/05/2017 100 40 - 104 IU/L      AST   Date Value Ref Range Status   06/05/2017 26 10 - 40 IU/L      ALT   Date Value Ref Range Status   05/11/2017 50 (H) 10 - 44 U/L Final     Anion Gap   Date Value Ref Range Status   05/11/2017 11 8 - 16 mmol/L Final     eGFR if    Date Value Ref Range Status   05/11/2017 >60 >60 mL/min/1.73 m^2 Final     eGFR if non    Date Value Ref Range Status   05/11/2017 >60 >60 mL/min/1.73 m^2 Final     Comment:     Calculation used to obtain the estimated glomerular filtration  rate (eGFR) is the CKD-EPI equation. Since race is unknown   in our information system, the eGFR values for   -American and Non--American patients are given   for each creatinine result.       No results found for: CEA  No results found for: PSA        Assessment/Plan:     Problem List Items Addressed This Visit     Malignant neoplasm of sigmoid colon (Chronic)     Sigmoid colectomy: 12/9/2016  T3N1M0 Stage III, KRAS mutated, NRAS neg, BRAF neg.   Began Folfox 2/13/2017  S/P 5 cycles  Folfox, PORT infection.  Changed to Xelox. 1st cycle June 5th, 2017.     High complexity patient with recent port infection, bipolar disorder, on chemotherapy.   This cycle represents 6th cycle overall.  Due for 6 more Xelox treatments.  Patient seems to be tolerating this well.  Continue treatment.                 Other Visit Diagnoses    None.         Discussion:   I have spent greater than 25 minutes in the care of this patient discussing the issues reflected in the assessment and plan, reviewing and updating patient data.  Greater than 50% face to face.  All questions answered to the patients satisfaction.    Return in about 4 weeks (around 7/18/2017).      Electronically signed by Delvin Martinez

## 2017-06-20 NOTE — ASSESSMENT & PLAN NOTE
Sigmoid colectomy: 12/9/2016  T3N1M0 Stage III, KRAS mutated, NRAS neg, BRAF neg.   Began Folfox 2/13/2017  S/P 5 cycles Folfox, PORT infection.  Changed to Xelox. 1st cycle June 5th, 2017.     High complexity patient with recent port infection, bipolar disorder, on chemotherapy.   This cycle represents 6th cycle overall.  Due for 6 more Xelox treatments.  Patient seems to be tolerating this well.  Continue treatment.

## 2017-06-23 RX ORDER — HEPARIN 100 UNIT/ML
500 SYRINGE INTRAVENOUS
Status: CANCELLED | OUTPATIENT
Start: 2017-06-26

## 2017-06-23 RX ORDER — SODIUM CHLORIDE 0.9 % (FLUSH) 0.9 %
10 SYRINGE (ML) INJECTION
Status: CANCELLED | OUTPATIENT
Start: 2017-06-26

## 2017-06-26 LAB
ALBUMIN SERPL-MCNC: 4.2 G/DL (ref 3.1–4.7)
ALP SERPL-CCNC: 83 IU/L (ref 40–104)
ALT (SGPT): 19 IU/L (ref 3–33)
AST SERPL-CCNC: 19 IU/L (ref 10–40)
BASOPHILS NFR BLD: 0 K/UL (ref 0–0.2)
BASOPHILS NFR BLD: 0.2 %
BILIRUB SERPL-MCNC: 0.3 MG/DL (ref 0.3–1)
BUN SERPL-MCNC: 12 MG/DL (ref 8–20)
CALCIUM SERPL-MCNC: 9.4 MG/DL (ref 7.7–10.4)
CHLORIDE: 105 MMOL/L (ref 98–110)
CO2 SERPL-SCNC: 27.6 MMOL/L (ref 22.8–31.6)
CREATININE: 0.91 MG/DL (ref 0.6–1.4)
EOSINOPHIL NFR BLD: 0.1 K/UL (ref 0–0.7)
EOSINOPHIL NFR BLD: 1.9 %
ERYTHROCYTE [DISTWIDTH] IN BLOOD BY AUTOMATED COUNT: 22.9 % (ref 12.5–14.5)
GLUCOSE: 98 MG/DL (ref 70–99)
GRAN #: 3.3 K/UL (ref 1.4–6.5)
GRAN%: 57.9 %
HCT VFR BLD AUTO: 33.1 % (ref 39–55)
HGB BLD-MCNC: 9.8 G/DL (ref 14–16)
IMMATURE GRANS (ABS): 0.1 K/UL (ref 0–1)
IMMATURE GRANULOCYTES: 0.9 %
LYMPH #: 1.6 K/UL (ref 1.2–3.4)
LYMPH%: 27.6 %
MCH RBC QN AUTO: 22.3 PG (ref 25–35)
MCHC RBC AUTO-ENTMCNC: 29.6 G/DL (ref 31–36)
MCV RBC AUTO: 75.2 FL (ref 80–100)
MONO #: 0.7 K/UL (ref 0.1–0.6)
MONO%: 11.5 %
NUCLEATED RBCS: 0 %
NUCLEATED RED BLOOD CELLS: 0 /100 WBC
PERFORMED BY:: ABNORMAL
PLATELET # BLD AUTO: 169 K/UL (ref 140–440)
PMV BLD AUTO: 8 FL (ref 8.8–12.7)
POTASSIUM SERPL-SCNC: 3.7 MMOL/L (ref 3.5–5)
PROT SERPL-MCNC: 7.3 G/DL (ref 6–8.2)
RBC # BLD AUTO: 4.4 M/UL (ref 4.3–5.9)
SODIUM: 139 MMOL/L (ref 134–144)
WBC # BLD: 5.7 K/UL (ref 5–10)

## 2017-07-14 RX ORDER — HEPARIN 100 UNIT/ML
500 SYRINGE INTRAVENOUS
Status: CANCELLED | OUTPATIENT
Start: 2017-07-17

## 2017-07-14 RX ORDER — SODIUM CHLORIDE 0.9 % (FLUSH) 0.9 %
10 SYRINGE (ML) INJECTION
Status: CANCELLED | OUTPATIENT
Start: 2017-07-17

## 2017-07-17 LAB
ALBUMIN SERPL-MCNC: 4.1 G/DL (ref 3.1–4.7)
ALP SERPL-CCNC: 82 IU/L (ref 40–104)
ALT (SGPT): 19 IU/L (ref 3–33)
AST SERPL-CCNC: 19 IU/L (ref 10–40)
BASOPHILS NFR BLD: 0 K/UL (ref 0–0.2)
BASOPHILS NFR BLD: 0.5 %
BILIRUB SERPL-MCNC: 0.7 MG/DL (ref 0.3–1)
BUN SERPL-MCNC: 15 MG/DL (ref 8–20)
CALCIUM SERPL-MCNC: 9.2 MG/DL (ref 7.7–10.4)
CHLORIDE: 104 MMOL/L (ref 98–110)
CO2 SERPL-SCNC: 26 MMOL/L (ref 22.8–31.6)
CREATININE: 0.85 MG/DL (ref 0.6–1.4)
EOSINOPHIL NFR BLD: 0.1 K/UL (ref 0–0.7)
EOSINOPHIL NFR BLD: 2.5 %
ERYTHROCYTE [DISTWIDTH] IN BLOOD BY AUTOMATED COUNT: 23.7 % (ref 12.5–14.5)
GLUCOSE: 101 MG/DL (ref 70–99)
GRAN #: 2.6 K/UL (ref 1.4–6.5)
GRAN%: 58.8 %
HCT VFR BLD AUTO: 32.9 % (ref 39–55)
HGB BLD-MCNC: 10.3 G/DL (ref 14–16)
IMMATURE GRANS (ABS): 0 K/UL (ref 0–1)
IMMATURE GRANULOCYTES: 0.5 %
LYMPH #: 1.2 K/UL (ref 1.2–3.4)
LYMPH%: 26.8 %
MCH RBC QN AUTO: 24.1 PG (ref 25–35)
MCHC RBC AUTO-ENTMCNC: 31.3 G/DL (ref 31–36)
MCV RBC AUTO: 76.9 FL (ref 80–100)
MONO #: 0.5 K/UL (ref 0.1–0.6)
MONO%: 10.9 %
NUCLEATED RBCS: 0 %
NUCLEATED RED BLOOD CELLS: 0 /100 WBC
PERFORMED BY:: ABNORMAL
PLATELET # BLD AUTO: 173 K/UL (ref 140–440)
PMV BLD AUTO: 8.8 FL (ref 8.8–12.7)
POTASSIUM SERPL-SCNC: 3.7 MMOL/L (ref 3.5–5)
PROT SERPL-MCNC: 7.4 G/DL (ref 6–8.2)
RBC # BLD AUTO: 4.28 M/UL (ref 4.3–5.9)
SODIUM: 138 MMOL/L (ref 134–144)
WBC # BLD: 4.3 K/UL (ref 5–10)

## 2017-07-21 ENCOUNTER — OFFICE VISIT (OUTPATIENT)
Dept: HEMATOLOGY/ONCOLOGY | Facility: CLINIC | Age: 52
End: 2017-07-21
Payer: COMMERCIAL

## 2017-07-21 VITALS
DIASTOLIC BLOOD PRESSURE: 81 MMHG | TEMPERATURE: 97 F | HEART RATE: 91 BPM | BODY MASS INDEX: 38.19 KG/M2 | HEIGHT: 72 IN | SYSTOLIC BLOOD PRESSURE: 119 MMHG | WEIGHT: 282 LBS

## 2017-07-21 DIAGNOSIS — C18.7 MALIGNANT NEOPLASM OF SIGMOID COLON: Primary | Chronic | ICD-10-CM

## 2017-07-21 DIAGNOSIS — C18.7 MALIGNANT NEOPLASM OF SIGMOID COLON: Chronic | ICD-10-CM

## 2017-07-21 PROCEDURE — 99214 OFFICE O/P EST MOD 30 MIN: CPT | Mod: ,,, | Performed by: INTERNAL MEDICINE

## 2017-07-21 RX ORDER — DRONABINOL 5 MG/1
10 CAPSULE ORAL DAILY
Qty: 60 CAPSULE | Refills: 2 | Status: CANCELLED | OUTPATIENT
Start: 2017-07-21

## 2017-07-21 NOTE — ASSESSMENT & PLAN NOTE
patietn is s/p 5 Folfox and 3 Xelox.  Tolerating with expected toxicities.  Neuropathy is present but improves after treatment day.  Labs look ok and overall patient seems to be doing well.

## 2017-07-21 NOTE — PROGRESS NOTES
PROGRESS NOTE    Subjective:       Patient ID: Faisal Aguirre III is a 51 y.o. male.    Chief Complaint:  Follow-up and Results (labs in epic)      History of Present Illness:   Faisal Aguirre III is a 51 y.o. male who presents with a history of colon cancer.   Patient doing ok on Xeloda.  Began June 5th with Oxaliplatin.  Doing well. Has 4 more cycles to go of Xelox to make 12 total.        Family and Social history reviewed and is unchanged from 1/25/2017.       ROS:  Review of Systems   Constitutional: Negative for fever and unexpected weight change.   HENT: Negative for nosebleeds.    Respiratory: Negative for chest tightness and shortness of breath.    Cardiovascular: Negative for chest pain.   Gastrointestinal: Negative for abdominal pain and blood in stool.   Genitourinary: Negative for hematuria.   Skin: Negative for rash.   Hematological: Does not bruise/bleed easily.          Current Outpatient Prescriptions:     amlodipine (NORVASC) 10 MG tablet, , Disp: , Rfl:     AMLODIPINE BESYLATE, BULK, MISC, Take 10 mg by mouth once daily at 6am. , Disp: , Rfl:     dronabinol (MARINOL) 10 MG capsule, Take 10 mg by mouth once daily., Disp: , Rfl:     finasteride (PROSCAR) 5 mg tablet, , Disp: , Rfl:     furosemide (LASIX) 40 MG tablet, Take 40 mg by mouth once daily. , Disp: , Rfl:     lactobacillus acidophilus & bulgar (LACTINEX) 100 million cell packet, Take 1 packet (1 each total) by mouth 2 (two) times daily., Disp: , Rfl:     MARINOL 5 mg capsule, , Disp: , Rfl:     omeprazole (PRILOSEC) 20 MG capsule, Take 20 mg by mouth once daily. , Disp: , Rfl:     ondansetron (ZOFRAN) 8 MG tablet, Take 8 mg by mouth every 8 (eight) hours., Disp: , Rfl: 0    potassium chloride (MICRO-K) 10 MEQ CpSR, Take 20 mEq by mouth once daily. , Disp: , Rfl:     promethazine (PHENERGAN) 25 MG tablet, Take 25 mg by mouth every 6 (six) hours., Disp: , Rfl: 0     simvastatin (ZOCOR) 40 MG tablet, , Disp: , Rfl:     tamsulosin (FLOMAX) 0.4 mg Cp24, Take 0.4 mg by mouth once daily. , Disp: , Rfl:     venlafaxine (EFFEXOR-XR) 75 MG 24 hr capsule, , Disp: , Rfl:     VENLAFAXINE HCL (VENLAFAXINE ORAL), Take 150 mg by mouth once daily at 6am. , Disp: , Rfl:         Objective:       Physical Examination:     /81   Pulse 91   Temp 97.4 °F (36.3 °C)   Ht 6' (1.829 m)   Wt 127.9 kg (282 lb)   BMI 38.25 kg/m²     Physical Exam   Constitutional: He is oriented to person, place, and time. He appears well-developed and well-nourished.   HENT:   Head: Normocephalic and atraumatic.   Right Ear: External ear normal.   Left Ear: External ear normal.   Mouth/Throat: Oropharynx is clear and moist.   Eyes: Conjunctivae are normal. Pupils are equal, round, and reactive to light. No scleral icterus.   Neck: Normal range of motion. Neck supple.   Cardiovascular: Normal rate, regular rhythm and normal heart sounds.  Exam reveals no gallop and no friction rub.    No murmur heard.  Pulmonary/Chest: Effort normal and breath sounds normal. No respiratory distress. He has no rales. He exhibits no tenderness.   Abdominal: Soft. Bowel sounds are normal. He exhibits no distension and no mass. There is no hepatosplenomegaly. There is no tenderness. There is no rebound and no guarding.   Musculoskeletal: He exhibits no edema.   Lymphadenopathy:        Head (right side): No tonsillar adenopathy present.        Head (left side): No tonsillar adenopathy present.     He has no cervical adenopathy.     He has no axillary adenopathy.        Right: No supraclavicular adenopathy present.        Left: No supraclavicular adenopathy present.   Neurological: He is alert and oriented to person, place, and time.   Psychiatric: He has a normal mood and affect. His behavior is normal. Judgment and thought content normal.   Vitals reviewed.      Labs:   Recent Results (from the past 336 hour(s))   CBC auto  differential    Collection Time: 07/17/17  9:18 AM   Result Value Ref Range    WBC 4.3 (L) 5.0 - 10.0 K/ul    Hemoglobin 10.3 (L) 14.0 - 16.0 g/dl    Hematocrit 32.9 (L) 39.0 - 55.0 %    Platelets 173 140 - 440 K/ul     CMP  Sodium   Date Value Ref Range Status   07/17/2017 138 134 - 144 mmol/L      Potassium   Date Value Ref Range Status   07/17/2017 3.7 3.5 - 5.0 mmol/L      Chloride   Date Value Ref Range Status   07/17/2017 104 98 - 110 mmol/L      CO2   Date Value Ref Range Status   07/17/2017 26.0 22.8 - 31.6 mmol/L      Glucose   Date Value Ref Range Status   07/17/2017 101 (H) 70 - 99 mg/dL      BUN, Bld   Date Value Ref Range Status   07/17/2017 15 8 - 20 mg/dL      Creatinine   Date Value Ref Range Status   07/17/2017 0.85 0.60 - 1.40 mg/dL      Calcium   Date Value Ref Range Status   07/17/2017 9.2 7.7 - 10.4 mg/dL      Total Protein   Date Value Ref Range Status   07/17/2017 7.4 6.0 - 8.2 g/dL      Albumin   Date Value Ref Range Status   07/17/2017 4.1 3.1 - 4.7 g/dL      Total Bilirubin   Date Value Ref Range Status   07/17/2017 0.7 0.3 - 1.0 mg/dL      Alkaline Phosphatase   Date Value Ref Range Status   07/17/2017 82 40 - 104 IU/L      AST   Date Value Ref Range Status   07/17/2017 19 10 - 40 IU/L      ALT   Date Value Ref Range Status   05/11/2017 50 (H) 10 - 44 U/L Final     Anion Gap   Date Value Ref Range Status   05/11/2017 11 8 - 16 mmol/L Final     eGFR if    Date Value Ref Range Status   05/11/2017 >60 >60 mL/min/1.73 m^2 Final     eGFR if non    Date Value Ref Range Status   05/11/2017 >60 >60 mL/min/1.73 m^2 Final     Comment:     Calculation used to obtain the estimated glomerular filtration  rate (eGFR) is the CKD-EPI equation. Since race is unknown   in our information system, the eGFR values for   -American and Non--American patients are given   for each creatinine result.       No results found for: CEA  No results found for:  PSA        Assessment/Plan:     Problem List Items Addressed This Visit     Malignant neoplasm of sigmoid colon (Chronic)     patietn is s/p 5 Folfox and 3 Xelox.  Tolerating with expected toxicities.  Neuropathy is present but improves after treatment day.  Labs look ok and overall patient seems to be doing well.             Other Visit Diagnoses    None.         Discussion:   High complexity patient due to complex chemo regimen and high risk medications.     Return in about 3 weeks (around 8/11/2017).      Electronically signed by Delvin Martinez

## 2017-08-03 RX ORDER — SODIUM CHLORIDE 0.9 % (FLUSH) 0.9 %
10 SYRINGE (ML) INJECTION
Status: CANCELLED | OUTPATIENT
Start: 2017-08-07

## 2017-08-03 RX ORDER — HEPARIN 100 UNIT/ML
500 SYRINGE INTRAVENOUS
Status: CANCELLED | OUTPATIENT
Start: 2017-08-07

## 2017-08-07 LAB
ALBUMIN SERPL-MCNC: 4 G/DL (ref 3.1–4.7)
ALP SERPL-CCNC: 83 IU/L (ref 40–104)
ALT (SGPT): 17 IU/L (ref 3–33)
AST SERPL-CCNC: 17 IU/L (ref 10–40)
BASOPHILS NFR BLD: 0 K/UL (ref 0–0.2)
BASOPHILS NFR BLD: 0.8 %
BILIRUB SERPL-MCNC: 0.7 MG/DL (ref 0.3–1)
BUN SERPL-MCNC: 15 MG/DL (ref 8–20)
CALCIUM SERPL-MCNC: 9.5 MG/DL (ref 7.7–10.4)
CHLORIDE: 103 MMOL/L (ref 98–110)
CO2 SERPL-SCNC: 26.4 MMOL/L (ref 22.8–31.6)
CREATININE: 0.84 MG/DL (ref 0.6–1.4)
EOSINOPHIL NFR BLD: 0.1 K/UL (ref 0–0.7)
EOSINOPHIL NFR BLD: 2.6 %
ERYTHROCYTE [DISTWIDTH] IN BLOOD BY AUTOMATED COUNT: 21.2 % (ref 12.5–14.5)
GLUCOSE: 87 MG/DL (ref 70–99)
GRAN #: 3.3 K/UL (ref 1.4–6.5)
GRAN%: 62.9 %
HCT VFR BLD AUTO: 35 % (ref 39–55)
HGB BLD-MCNC: 10.7 G/DL (ref 14–16)
IMMATURE GRANS (ABS): 0 K/UL (ref 0–1)
IMMATURE GRANULOCYTES: 0.8 %
LYMPH #: 1.2 K/UL (ref 1.2–3.4)
LYMPH%: 22.2 %
MCH RBC QN AUTO: 23.8 PG (ref 25–35)
MCHC RBC AUTO-ENTMCNC: 30.6 G/DL (ref 31–36)
MCV RBC AUTO: 78 FL (ref 80–100)
MONO #: 0.6 K/UL (ref 0.1–0.6)
MONO%: 10.7 %
NUCLEATED RBCS: 0 %
NUCLEATED RED BLOOD CELLS: 0 /100 WBC
PERFORMED BY:: ABNORMAL
PLATELET # BLD AUTO: 205 K/UL (ref 140–440)
PMV BLD AUTO: 9.1 FL (ref 8.8–12.7)
POTASSIUM SERPL-SCNC: 4.1 MMOL/L (ref 3.5–5)
PROT SERPL-MCNC: 8.3 G/DL (ref 6–8.2)
RBC # BLD AUTO: 4.49 M/UL (ref 4.3–5.9)
SODIUM: 137 MMOL/L (ref 134–144)
WBC # BLD: 5.3 K/UL (ref 5–10)

## 2017-08-10 ENCOUNTER — OFFICE VISIT (OUTPATIENT)
Dept: HEMATOLOGY/ONCOLOGY | Facility: CLINIC | Age: 52
End: 2017-08-10
Payer: COMMERCIAL

## 2017-08-10 VITALS
BODY MASS INDEX: 37.84 KG/M2 | WEIGHT: 279 LBS | HEART RATE: 75 BPM | SYSTOLIC BLOOD PRESSURE: 148 MMHG | DIASTOLIC BLOOD PRESSURE: 97 MMHG | RESPIRATION RATE: 18 BRPM | TEMPERATURE: 98 F

## 2017-08-10 DIAGNOSIS — C18.7 MALIGNANT NEOPLASM OF SIGMOID COLON: Chronic | ICD-10-CM

## 2017-08-10 PROCEDURE — 3077F SYST BP >= 140 MM HG: CPT | Mod: ,,, | Performed by: INTERNAL MEDICINE

## 2017-08-10 PROCEDURE — 3008F BODY MASS INDEX DOCD: CPT | Mod: ,,, | Performed by: INTERNAL MEDICINE

## 2017-08-10 PROCEDURE — 3080F DIAST BP >= 90 MM HG: CPT | Mod: ,,, | Performed by: INTERNAL MEDICINE

## 2017-08-10 PROCEDURE — 99214 OFFICE O/P EST MOD 30 MIN: CPT | Mod: ,,, | Performed by: INTERNAL MEDICINE

## 2017-08-10 RX ORDER — SODIUM CHLORIDE 0.9 % (FLUSH) 0.9 %
10 SYRINGE (ML) INJECTION
Status: CANCELLED | OUTPATIENT
Start: 2017-08-28

## 2017-08-10 RX ORDER — HEPARIN 100 UNIT/ML
500 SYRINGE INTRAVENOUS
Status: CANCELLED | OUTPATIENT
Start: 2017-08-28

## 2017-08-10 RX ORDER — CAPECITABINE 150 MG/1
2500 TABLET, FILM COATED ORAL 2 TIMES DAILY
COMMUNITY
End: 2018-07-17

## 2017-08-10 NOTE — ASSESSMENT & PLAN NOTE
Sigmoid colectomy: 12/9/2016  T3N1M0 Stage III, KRAS mutated, NRAS neg, BRAF neg.   Began Folfox 2/13/2017  S/P 5 cycles Folfox, PORT infection.  Changed to Xelox. 1st cycle June 5th, 2017.     Patient is on cycle 4 of xelox and needs a total of 7 to complete therapy.  Is tolerating with expected toxicities, predominatley fatigue and weakness.      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  Will reduce the dose by 25% to hopefully prevent permanent damage.  Discussed today.

## 2017-08-10 NOTE — PROGRESS NOTES
PROGRESS NOTE    Subjective:       Patient ID: Faisal Aguirre III is a 51 y.o. male.    Chief Complaint:  No chief complaint on file.      History of Present Illness:   Faisal Aguirre III is a 51 y.o. male who presents with a history of colon cancer.   Patient doing ok on Xeloda.  Began June 5th with Oxaliplatin.  Doing well. Has 3 more cycles to go of Xelox to make 12 total.        Family and Social history reviewed and is unchanged from 1/25/2017.       ROS:  Review of Systems   Constitutional: Negative for fever and unexpected weight change.   HENT: Negative for nosebleeds.    Respiratory: Negative for chest tightness and shortness of breath.    Cardiovascular: Negative for chest pain.   Gastrointestinal: Negative for abdominal pain and blood in stool.   Genitourinary: Negative for hematuria.   Skin: Negative for rash.   Hematological: Does not bruise/bleed easily.          Current Outpatient Prescriptions:     amlodipine (NORVASC) 10 MG tablet, , Disp: , Rfl:     AMLODIPINE BESYLATE, BULK, MISC, Take 10 mg by mouth once daily at 6am. , Disp: , Rfl:     capecitabine (XELODA) 150 MG tablet, Take 2,500 mg by mouth 2 (two) times daily. Takes for 14 days then off 7 days, Disp: , Rfl:     dronabinol (MARINOL) 10 MG capsule, Take 10 mg by mouth once daily., Disp: , Rfl:     finasteride (PROSCAR) 5 mg tablet, , Disp: , Rfl:     furosemide (LASIX) 40 MG tablet, Take 40 mg by mouth once daily. , Disp: , Rfl:     lactobacillus acidophilus & bulgar (LACTINEX) 100 million cell packet, Take 1 packet (1 each total) by mouth 2 (two) times daily., Disp: , Rfl:     MARINOL 5 mg capsule, , Disp: , Rfl:     omeprazole (PRILOSEC) 20 MG capsule, Take 20 mg by mouth once daily. , Disp: , Rfl:     ondansetron (ZOFRAN) 8 MG tablet, Take 8 mg by mouth every 8 (eight) hours., Disp: , Rfl: 0    potassium chloride (MICRO-K) 10 MEQ CpSR, Take 20 mEq by mouth once daily. ,  Disp: , Rfl:     promethazine (PHENERGAN) 25 MG tablet, Take 25 mg by mouth every 6 (six) hours., Disp: , Rfl: 0    simvastatin (ZOCOR) 40 MG tablet, , Disp: , Rfl:     tamsulosin (FLOMAX) 0.4 mg Cp24, Take 0.4 mg by mouth once daily. , Disp: , Rfl:     venlafaxine (EFFEXOR-XR) 75 MG 24 hr capsule, , Disp: , Rfl:     VENLAFAXINE HCL (VENLAFAXINE ORAL), Take 150 mg by mouth once daily at 6am. , Disp: , Rfl:         Objective:       Physical Examination:     BP (!) 148/97   Pulse 75   Temp 98.2 °F (36.8 °C) (Oral)   Resp 18   Wt 126.6 kg (279 lb)   BMI 37.84 kg/m²     Physical Exam   Constitutional: He is oriented to person, place, and time. He appears well-developed and well-nourished.   HENT:   Head: Normocephalic and atraumatic.   Right Ear: External ear normal.   Left Ear: External ear normal.   Mouth/Throat: Oropharynx is clear and moist.   Eyes: Conjunctivae are normal. Pupils are equal, round, and reactive to light. No scleral icterus.   Neck: Normal range of motion. Neck supple.   Cardiovascular: Normal rate, regular rhythm and normal heart sounds.  Exam reveals no gallop and no friction rub.    No murmur heard.  Pulmonary/Chest: Effort normal and breath sounds normal. No respiratory distress. He has no rales. He exhibits no tenderness.   Abdominal: Soft. Bowel sounds are normal. He exhibits no distension and no mass. There is no hepatosplenomegaly. There is no tenderness. There is no rebound and no guarding.   Musculoskeletal: He exhibits no edema.   Lymphadenopathy:        Head (right side): No tonsillar adenopathy present.        Head (left side): No tonsillar adenopathy present.     He has no cervical adenopathy.     He has no axillary adenopathy.        Right: No supraclavicular adenopathy present.        Left: No supraclavicular adenopathy present.   Neurological: He is alert and oriented to person, place, and time.   Psychiatric: He has a normal mood and affect. His behavior is normal.  Judgment and thought content normal.   Vitals reviewed.      Labs:   Recent Results (from the past 336 hour(s))   CBC auto differential    Collection Time: 08/07/17  7:54 AM   Result Value Ref Range    WBC 5.3 5.0 - 10.0 K/ul    Hemoglobin 10.7 (L) 14.0 - 16.0 g/dl    Hematocrit 35.0 (L) 39.0 - 55.0 %    Platelets 205 140 - 440 K/ul     CMP  Sodium   Date Value Ref Range Status   08/07/2017 137 134 - 144 mmol/L      Potassium   Date Value Ref Range Status   08/07/2017 4.1 3.5 - 5.0 mmol/L      Chloride   Date Value Ref Range Status   08/07/2017 103 98 - 110 mmol/L      CO2   Date Value Ref Range Status   08/07/2017 26.4 22.8 - 31.6 mmol/L      Glucose   Date Value Ref Range Status   08/07/2017 87 70 - 99 mg/dL      BUN, Bld   Date Value Ref Range Status   08/07/2017 15 8 - 20 mg/dL      Creatinine   Date Value Ref Range Status   08/07/2017 0.84 0.60 - 1.40 mg/dL      Calcium   Date Value Ref Range Status   08/07/2017 9.5 7.7 - 10.4 mg/dL      Total Protein   Date Value Ref Range Status   08/07/2017 8.3 (H) 6.0 - 8.2 g/dL      Albumin   Date Value Ref Range Status   08/07/2017 4.0 3.1 - 4.7 g/dL      Total Bilirubin   Date Value Ref Range Status   08/07/2017 0.7 0.3 - 1.0 mg/dL      Alkaline Phosphatase   Date Value Ref Range Status   08/07/2017 83 40 - 104 IU/L      AST   Date Value Ref Range Status   08/07/2017 17 10 - 40 IU/L      ALT   Date Value Ref Range Status   05/11/2017 50 (H) 10 - 44 U/L Final     Anion Gap   Date Value Ref Range Status   05/11/2017 11 8 - 16 mmol/L Final     eGFR if    Date Value Ref Range Status   05/11/2017 >60 >60 mL/min/1.73 m^2 Final     eGFR if non    Date Value Ref Range Status   05/11/2017 >60 >60 mL/min/1.73 m^2 Final     Comment:     Calculation used to obtain the estimated glomerular filtration  rate (eGFR) is the CKD-EPI equation. Since race is unknown   in our information system, the eGFR values for   -American and Non--American  patients are given   for each creatinine result.       No results found for: CEA  No results found for: PSA        Assessment/Plan:     Problem List Items Addressed This Visit     Malignant neoplasm of sigmoid colon (Chronic)     Sigmoid colectomy: 12/9/2016  T3N1M0 Stage III, KRAS mutated, NRAS neg, BRAF neg.   Began Folfox 2/13/2017  S/P 5 cycles Folfox, PORT infection.  Changed to Xelox. 1st cycle June 5th, 2017.     Patient is on cycle 4 of xelox and needs a total of 7 to complete therapy.  Is tolerating with expected toxicities, predominatley fatigue and weakness.      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  Will reduce the dose by 25% to hopefully prevent permanent damage.  Discussed today.            Other Visit Diagnoses    None.         Discussion:   High complexity patient due to complex chemo regimen and high risk medications.     Return in about 4 weeks (around 9/7/2017).      Electronically signed by Delvin Martinez

## 2017-08-28 LAB
ALBUMIN SERPL-MCNC: 4.5 G/DL (ref 3.1–4.7)
ALP SERPL-CCNC: 85 IU/L (ref 40–104)
ALT (SGPT): 23 IU/L (ref 3–33)
AST SERPL-CCNC: 24 IU/L (ref 10–40)
BASOPHILS NFR BLD: 0 K/UL (ref 0–0.2)
BASOPHILS NFR BLD: 0.6 %
BILIRUB SERPL-MCNC: 0.7 MG/DL (ref 0.3–1)
BUN SERPL-MCNC: 14 MG/DL (ref 8–20)
CALCIUM SERPL-MCNC: 9.6 MG/DL (ref 7.7–10.4)
CHLORIDE: 106 MMOL/L (ref 98–110)
CO2 SERPL-SCNC: 28.2 MMOL/L (ref 22.8–31.6)
CREATININE: 0.91 MG/DL (ref 0.6–1.4)
EOSINOPHIL NFR BLD: 0.2 K/UL (ref 0–0.7)
EOSINOPHIL NFR BLD: 2.7 %
ERYTHROCYTE [DISTWIDTH] IN BLOOD BY AUTOMATED COUNT: 21.4 % (ref 12.5–14.5)
GLUCOSE: 99 MG/DL (ref 70–99)
GRAN #: 4.2 K/UL (ref 1.4–6.5)
GRAN%: 66.3 %
HCT VFR BLD AUTO: 38.1 % (ref 39–55)
HGB BLD-MCNC: 11.7 G/DL (ref 14–16)
IMMATURE GRANS (ABS): 0.1 K/UL (ref 0–1)
IMMATURE GRANULOCYTES: 0.9 %
LYMPH #: 1.2 K/UL (ref 1.2–3.4)
LYMPH%: 19 %
MCH RBC QN AUTO: 24.2 PG (ref 25–35)
MCHC RBC AUTO-ENTMCNC: 30.7 G/DL (ref 31–36)
MCV RBC AUTO: 78.7 FL (ref 80–100)
MONO #: 0.7 K/UL (ref 0.1–0.6)
MONO%: 10.5 %
NUCLEATED RBCS: 0 %
NUCLEATED RED BLOOD CELLS: 0 /100 WBC
PERFORMED BY:: ABNORMAL
PLATELET # BLD AUTO: 196 K/UL (ref 140–440)
PMV BLD AUTO: 9 FL (ref 8.8–12.7)
POTASSIUM SERPL-SCNC: 4.1 MMOL/L (ref 3.5–5)
PROT SERPL-MCNC: 8.3 G/DL (ref 6–8.2)
RBC # BLD AUTO: 4.84 M/UL (ref 4.3–5.9)
SODIUM: 138 MMOL/L (ref 134–144)
WBC # BLD: 6.4 K/UL (ref 5–10)

## 2017-09-13 ENCOUNTER — OFFICE VISIT (OUTPATIENT)
Dept: HEMATOLOGY/ONCOLOGY | Facility: CLINIC | Age: 52
End: 2017-09-13
Payer: COMMERCIAL

## 2017-09-13 VITALS
WEIGHT: 289.69 LBS | HEIGHT: 72 IN | BODY MASS INDEX: 39.24 KG/M2 | TEMPERATURE: 98 F | RESPIRATION RATE: 18 BRPM | SYSTOLIC BLOOD PRESSURE: 146 MMHG | HEART RATE: 91 BPM | DIASTOLIC BLOOD PRESSURE: 93 MMHG

## 2017-09-13 DIAGNOSIS — C18.7 MALIGNANT NEOPLASM OF SIGMOID COLON: Chronic | ICD-10-CM

## 2017-09-13 PROCEDURE — 3077F SYST BP >= 140 MM HG: CPT | Mod: ,,, | Performed by: INTERNAL MEDICINE

## 2017-09-13 PROCEDURE — 99214 OFFICE O/P EST MOD 30 MIN: CPT | Mod: ,,, | Performed by: INTERNAL MEDICINE

## 2017-09-13 PROCEDURE — 3008F BODY MASS INDEX DOCD: CPT | Mod: ,,, | Performed by: INTERNAL MEDICINE

## 2017-09-13 PROCEDURE — 3080F DIAST BP >= 90 MM HG: CPT | Mod: ,,, | Performed by: INTERNAL MEDICINE

## 2017-09-13 NOTE — PROGRESS NOTES
PROGRESS NOTE    Subjective:       Patient ID: Faisal Aguirre III is a 52 y.o. male.    Chief Complaint:  Follow-up and Results (labs in media)      History of Present Illness:   Faisal Aguirre III is a 52 y.o. male who presents with a history of colon cancer.   Patient doing ok on Xeloda.  Began June 5th with Oxaliplatin.  Doing well. Has 2 more cycles to go of Xelox to make 12 total.    No new compaints.     Family and Social history reviewed and is unchanged from 1/25/2017.       ROS:  Review of Systems   Constitutional: Negative for fever and unexpected weight change.   HENT: Negative for nosebleeds.    Respiratory: Negative for chest tightness and shortness of breath.    Cardiovascular: Negative for chest pain.   Gastrointestinal: Negative for abdominal pain and blood in stool.   Genitourinary: Negative for hematuria.   Skin: Negative for rash.   Hematological: Does not bruise/bleed easily.          Current Outpatient Prescriptions:     amlodipine (NORVASC) 10 MG tablet, , Disp: , Rfl:     AMLODIPINE BESYLATE, BULK, MISC, Take 10 mg by mouth once daily at 6am. , Disp: , Rfl:     capecitabine (XELODA) 150 MG tablet, Take 2,500 mg by mouth 2 (two) times daily. Takes for 14 days then off 7 days, Disp: , Rfl:     dronabinol (MARINOL) 10 MG capsule, Take 10 mg by mouth once daily., Disp: , Rfl:     finasteride (PROSCAR) 5 mg tablet, , Disp: , Rfl:     furosemide (LASIX) 40 MG tablet, Take 40 mg by mouth once daily. , Disp: , Rfl:     lactobacillus acidophilus & bulgar (LACTINEX) 100 million cell packet, Take 1 packet (1 each total) by mouth 2 (two) times daily., Disp: , Rfl:     MARINOL 5 mg capsule, , Disp: , Rfl:     omeprazole (PRILOSEC) 20 MG capsule, Take 20 mg by mouth once daily. , Disp: , Rfl:     ondansetron (ZOFRAN) 8 MG tablet, Take 8 mg by mouth every 8 (eight) hours., Disp: , Rfl: 0    potassium chloride (MICRO-K) 10 MEQ CpSR, Take 20  mEq by mouth once daily. , Disp: , Rfl:     promethazine (PHENERGAN) 25 MG tablet, Take 25 mg by mouth every 6 (six) hours., Disp: , Rfl: 0    simvastatin (ZOCOR) 40 MG tablet, , Disp: , Rfl:     tamsulosin (FLOMAX) 0.4 mg Cp24, Take 0.4 mg by mouth once daily. , Disp: , Rfl:     venlafaxine (EFFEXOR-XR) 75 MG 24 hr capsule, , Disp: , Rfl:     VENLAFAXINE HCL (VENLAFAXINE ORAL), Take 150 mg by mouth once daily at 6am. , Disp: , Rfl:         Objective:       Physical Examination:     BP (!) 146/93   Pulse 91   Temp 98.2 °F (36.8 °C)   Resp 18   Ht 6' (1.829 m)   Wt 131.4 kg (289 lb 11.2 oz)   BMI 39.29 kg/m²     Physical Exam   Constitutional: He is oriented to person, place, and time. He appears well-developed and well-nourished.   HENT:   Head: Normocephalic and atraumatic.   Right Ear: External ear normal.   Left Ear: External ear normal.   Mouth/Throat: Oropharynx is clear and moist.   Eyes: Conjunctivae are normal. Pupils are equal, round, and reactive to light. No scleral icterus.   Neck: Normal range of motion. Neck supple.   Cardiovascular: Normal rate, regular rhythm and normal heart sounds.  Exam reveals no gallop and no friction rub.    No murmur heard.  Pulmonary/Chest: Effort normal and breath sounds normal. No respiratory distress. He has no rales. He exhibits no tenderness.   Abdominal: Soft. Bowel sounds are normal. He exhibits no distension and no mass. There is no hepatosplenomegaly. There is no tenderness. There is no rebound and no guarding.   Musculoskeletal: He exhibits no edema.   Lymphadenopathy:        Head (right side): No tonsillar adenopathy present.        Head (left side): No tonsillar adenopathy present.     He has no cervical adenopathy.     He has no axillary adenopathy.        Right: No supraclavicular adenopathy present.        Left: No supraclavicular adenopathy present.   Neurological: He is alert and oriented to person, place, and time.   Psychiatric: He has a normal  mood and affect. His behavior is normal. Judgment and thought content normal.   Vitals reviewed.      Labs:   No results found for this or any previous visit (from the past 336 hour(s)).  CMP  Sodium   Date Value Ref Range Status   08/28/2017 138 134 - 144 mmol/L      Potassium   Date Value Ref Range Status   08/28/2017 4.1 3.5 - 5.0 mmol/L      Chloride   Date Value Ref Range Status   08/28/2017 106 98 - 110 mmol/L      CO2   Date Value Ref Range Status   08/28/2017 28.2 22.8 - 31.6 mmol/L      Glucose   Date Value Ref Range Status   08/28/2017 99 70 - 99 mg/dL      BUN, Bld   Date Value Ref Range Status   08/28/2017 14 8 - 20 mg/dL      Creatinine   Date Value Ref Range Status   08/28/2017 0.91 0.60 - 1.40 mg/dL      Calcium   Date Value Ref Range Status   08/28/2017 9.6 7.7 - 10.4 mg/dL      Total Protein   Date Value Ref Range Status   08/28/2017 8.3 (H) 6.0 - 8.2 g/dL      Albumin   Date Value Ref Range Status   08/28/2017 4.5 3.1 - 4.7 g/dL      Total Bilirubin   Date Value Ref Range Status   08/28/2017 0.7 0.3 - 1.0 mg/dL      Alkaline Phosphatase   Date Value Ref Range Status   08/28/2017 85 40 - 104 IU/L      AST   Date Value Ref Range Status   08/28/2017 24 10 - 40 IU/L      ALT   Date Value Ref Range Status   05/11/2017 50 (H) 10 - 44 U/L Final     Anion Gap   Date Value Ref Range Status   05/11/2017 11 8 - 16 mmol/L Final     eGFR if    Date Value Ref Range Status   05/11/2017 >60 >60 mL/min/1.73 m^2 Final     eGFR if non    Date Value Ref Range Status   05/11/2017 >60 >60 mL/min/1.73 m^2 Final     Comment:     Calculation used to obtain the estimated glomerular filtration  rate (eGFR) is the CKD-EPI equation. Since race is unknown   in our information system, the eGFR values for   -American and Non--American patients are given   for each creatinine result.       No results found for: CEA  No results found for: PSA        Assessment/Plan:     Problem List  Items Addressed This Visit     Malignant neoplasm of sigmoid colon (Chronic)     Patient is s/p 5 cycles of Xelox which followed 5 cycles of folfox.   Oxaliplatin is at 75%.  Patient still has some neuopathy at this point without significant changes. Patient is to go to 7 cycles of Xelox and has only 2 more remaining.  Will continue treatment.             Other Visit Diagnoses    None.         Discussion:   High complexity patient due to complex chemo regimen and high risk medications.     Return in about 3 weeks (around 10/4/2017).      Electronically signed by Delvin Martinez

## 2017-09-13 NOTE — ASSESSMENT & PLAN NOTE
Patient is s/p 5 cycles of Xelox which followed 5 cycles of folfox.   Oxaliplatin is at 75%.  Patient still has some neuopathy at this point without significant changes. Patient is to go to 7 cycles of Xelox and has only 2 more remaining.  Will continue treatment.

## 2017-09-18 ENCOUNTER — TELEPHONE (OUTPATIENT)
Dept: HEMATOLOGY/ONCOLOGY | Facility: CLINIC | Age: 52
End: 2017-09-18

## 2017-09-18 ENCOUNTER — HISTORICAL (OUTPATIENT)
Dept: ADMINISTRATIVE | Facility: HOSPITAL | Age: 52
End: 2017-09-18

## 2017-09-18 LAB
ALBUMIN SERPL-MCNC: 4.2 G/DL (ref 3.1–4.7)
ALP SERPL-CCNC: 77 IU/L (ref 40–104)
ALT (SGPT): 25 IU/L (ref 3–33)
AST SERPL-CCNC: 25 IU/L (ref 10–40)
BASOPHILS NFR BLD: 0 K/UL (ref 0–0.2)
BASOPHILS NFR BLD: 0.5 %
BILIRUB SERPL-MCNC: 0.6 MG/DL (ref 0.3–1)
BUN SERPL-MCNC: 11 MG/DL (ref 8–20)
CALCIUM SERPL-MCNC: 9.4 MG/DL (ref 7.7–10.4)
CHLORIDE: 104 MMOL/L (ref 98–110)
CO2 SERPL-SCNC: 27 MMOL/L (ref 22.8–31.6)
CREATININE: 0.82 MG/DL (ref 0.6–1.4)
EOSINOPHIL NFR BLD: 0.2 K/UL (ref 0–0.7)
EOSINOPHIL NFR BLD: 3.1 %
ERYTHROCYTE [DISTWIDTH] IN BLOOD BY AUTOMATED COUNT: 21.7 % (ref 12.5–14.5)
GLUCOSE: 96 MG/DL (ref 70–99)
GRAN #: 4 K/UL (ref 1.4–6.5)
GRAN%: 65.7 %
HCT VFR BLD AUTO: 35.3 % (ref 39–55)
HGB BLD-MCNC: 10.9 G/DL (ref 14–16)
IMMATURE GRANS (ABS): 0.1 K/UL (ref 0–1)
IMMATURE GRANULOCYTES: 1 %
LYMPH #: 1.3 K/UL (ref 1.2–3.4)
LYMPH%: 20.8 %
MCH RBC QN AUTO: 24.8 PG (ref 25–35)
MCHC RBC AUTO-ENTMCNC: 30.9 G/DL (ref 31–36)
MCV RBC AUTO: 80.4 FL (ref 80–100)
MONO #: 0.5 K/UL (ref 0.1–0.6)
MONO%: 8.9 %
NUCLEATED RBCS: 0 %
NUCLEATED RED BLOOD CELLS: 0 /100 WBC
PERFORMED BY:: ABNORMAL
PLATELET # BLD AUTO: 168 K/UL (ref 140–440)
PMV BLD AUTO: 8.3 FL (ref 8.8–12.7)
POTASSIUM SERPL-SCNC: 3.8 MMOL/L (ref 3.5–5)
PROT SERPL-MCNC: 8.2 G/DL (ref 6–8.2)
RBC # BLD AUTO: 4.39 M/UL (ref 4.3–5.9)
SODIUM: 138 MMOL/L (ref 134–144)
WBC # BLD: 6.1 K/UL (ref 5–10)

## 2017-09-18 RX ORDER — SODIUM CHLORIDE 0.9 % (FLUSH) 0.9 %
10 SYRINGE (ML) INJECTION
Status: CANCELLED | OUTPATIENT
Start: 2017-09-18

## 2017-09-18 RX ORDER — HEPARIN 100 UNIT/ML
500 SYRINGE INTRAVENOUS
Status: CANCELLED | OUTPATIENT
Start: 2017-09-18

## 2017-09-18 NOTE — TELEPHONE ENCOUNTER
Patient is next door at infusion for oxaliplatin today. He says he forgot to stop his xeloda and is 4 days overdue to stop.  asked what to do. He says for patient to stop xeloda today for 7 days then resume as scheduled.[facility] infusion nurse is taking care of him today and she will let him know.

## 2017-10-05 ENCOUNTER — OFFICE VISIT (OUTPATIENT)
Dept: HEMATOLOGY/ONCOLOGY | Facility: CLINIC | Age: 52
End: 2017-10-05
Payer: COMMERCIAL

## 2017-10-05 VITALS
BODY MASS INDEX: 38.82 KG/M2 | SYSTOLIC BLOOD PRESSURE: 132 MMHG | RESPIRATION RATE: 18 BRPM | TEMPERATURE: 98 F | DIASTOLIC BLOOD PRESSURE: 91 MMHG | HEART RATE: 99 BPM | HEIGHT: 72 IN | WEIGHT: 286.63 LBS

## 2017-10-05 DIAGNOSIS — C18.7 MALIGNANT NEOPLASM OF SIGMOID COLON: Chronic | ICD-10-CM

## 2017-10-05 PROCEDURE — 99214 OFFICE O/P EST MOD 30 MIN: CPT | Mod: ,,, | Performed by: INTERNAL MEDICINE

## 2017-10-05 RX ORDER — HEPARIN 100 UNIT/ML
500 SYRINGE INTRAVENOUS
Status: CANCELLED | OUTPATIENT
Start: 2017-10-09

## 2017-10-05 RX ORDER — SODIUM CHLORIDE 0.9 % (FLUSH) 0.9 %
10 SYRINGE (ML) INJECTION
Status: CANCELLED | OUTPATIENT
Start: 2017-10-09

## 2017-10-05 NOTE — ASSESSMENT & PLAN NOTE
Patient has one more Xelox to go and is tolerating fairly well.  Will plan scans on next visit and watch labs for approximately one month.  Patient exam is negative.

## 2017-10-09 LAB
ALBUMIN SERPL-MCNC: 4 G/DL (ref 3.1–4.7)
ALP SERPL-CCNC: 80 IU/L (ref 40–104)
ALT (SGPT): 28 IU/L (ref 3–33)
AST SERPL-CCNC: 30 IU/L (ref 10–40)
BASOPHILS NFR BLD: 0 K/UL (ref 0–0.2)
BASOPHILS NFR BLD: 0.7 %
BILIRUB SERPL-MCNC: 0.6 MG/DL (ref 0.3–1)
BUN SERPL-MCNC: 9 MG/DL (ref 8–20)
CALCIUM SERPL-MCNC: 9.3 MG/DL (ref 7.7–10.4)
CHLORIDE: 106 MMOL/L (ref 98–110)
CO2 SERPL-SCNC: 22.5 MMOL/L (ref 22.8–31.6)
CREATININE: 0.82 MG/DL (ref 0.6–1.4)
EOSINOPHIL NFR BLD: 0.1 K/UL (ref 0–0.7)
EOSINOPHIL NFR BLD: 2.4 %
ERYTHROCYTE [DISTWIDTH] IN BLOOD BY AUTOMATED COUNT: 19.4 % (ref 12.5–14.5)
GLUCOSE: 98 MG/DL (ref 70–99)
GRAN #: 4 K/UL (ref 1.4–6.5)
GRAN%: 68.3 %
HCT VFR BLD AUTO: 34.1 % (ref 39–55)
HGB BLD-MCNC: 10.9 G/DL (ref 14–16)
IMMATURE GRANS (ABS): 0 K/UL (ref 0–1)
IMMATURE GRANULOCYTES: 0.7 %
LYMPH #: 1.1 K/UL (ref 1.2–3.4)
LYMPH%: 18.9 %
MCH RBC QN AUTO: 25.1 PG (ref 25–35)
MCHC RBC AUTO-ENTMCNC: 32 G/DL (ref 31–36)
MCV RBC AUTO: 78.4 FL (ref 80–100)
MONO #: 0.5 K/UL (ref 0.1–0.6)
MONO%: 9 %
NUCLEATED RBCS: 0 %
NUCLEATED RED BLOOD CELLS: 0 /100 WBC
PERFORMED BY:: ABNORMAL
PLATELET # BLD AUTO: 182 K/UL (ref 140–440)
PMV BLD AUTO: 8.6 FL (ref 8.8–12.7)
POTASSIUM SERPL-SCNC: 3.8 MMOL/L (ref 3.5–5)
PROT SERPL-MCNC: 7.9 G/DL (ref 6–8.2)
RBC # BLD AUTO: 4.35 M/UL (ref 4.3–5.9)
SODIUM: 138 MMOL/L (ref 134–144)
WBC # BLD: 5.8 K/UL (ref 5–10)

## 2017-10-26 ENCOUNTER — OFFICE VISIT (OUTPATIENT)
Dept: HEMATOLOGY/ONCOLOGY | Facility: CLINIC | Age: 52
End: 2017-10-26
Payer: COMMERCIAL

## 2017-10-26 VITALS
WEIGHT: 284 LBS | BODY MASS INDEX: 38.47 KG/M2 | SYSTOLIC BLOOD PRESSURE: 107 MMHG | HEART RATE: 96 BPM | RESPIRATION RATE: 18 BRPM | HEIGHT: 72 IN | TEMPERATURE: 98 F | DIASTOLIC BLOOD PRESSURE: 75 MMHG

## 2017-10-26 DIAGNOSIS — C18.7 MALIGNANT NEOPLASM OF SIGMOID COLON: Chronic | ICD-10-CM

## 2017-10-26 PROCEDURE — 99213 OFFICE O/P EST LOW 20 MIN: CPT | Mod: ,,, | Performed by: INTERNAL MEDICINE

## 2017-10-26 NOTE — ASSESSMENT & PLAN NOTE
Patient refused last cycle of chemotherapy, see hpi.  Patient is having some neuropathy as well which is grade II to III so last cycle of oxaliplatin may not be a good idea any way.  Will get scans done in the next month with labs including CEA.  Will get pet scan as there was some uptake on the previous scan.   Patient doing well otherwise.

## 2017-10-26 NOTE — PROGRESS NOTES
PROGRESS NOTE    Subjective:       Patient ID: Faisal Aguirre III is a 52 y.o. male.    Chief Complaint:  No chief complaint on file.  follow up colon cancer    History of Present Illness:   Faisal Aguirre III is a 52 y.o. male who presents with a history of colon cancer.  Patient became frustrated at last chemo visit when he could not get his own room.  Patient is very personal and became uncomfortable with process of being in the large room.        Family and Social history reviewed and is unchanged from 1/25/2017.       ROS:  Review of Systems   Constitutional: Negative for fever and unexpected weight change.   HENT: Negative for nosebleeds.    Respiratory: Negative for chest tightness and shortness of breath.    Cardiovascular: Negative for chest pain.   Gastrointestinal: Negative for abdominal pain and blood in stool.   Genitourinary: Negative for hematuria.   Skin: Negative for rash.   Hematological: Does not bruise/bleed easily.          Current Outpatient Prescriptions:     amlodipine (NORVASC) 10 MG tablet, , Disp: , Rfl:     AMLODIPINE BESYLATE, BULK, MISC, Take 10 mg by mouth once daily at 6am. , Disp: , Rfl:     capecitabine (XELODA) 150 MG tablet, Take 2,500 mg by mouth 2 (two) times daily. Takes for 14 days then off 7 days, Disp: , Rfl:     dronabinol (MARINOL) 10 MG capsule, Take 10 mg by mouth once daily., Disp: , Rfl:     finasteride (PROSCAR) 5 mg tablet, , Disp: , Rfl:     furosemide (LASIX) 40 MG tablet, Take 40 mg by mouth once daily. , Disp: , Rfl:     lactobacillus acidophilus & bulgar (LACTINEX) 100 million cell packet, Take 1 packet (1 each total) by mouth 2 (two) times daily., Disp: , Rfl:     MARINOL 5 mg capsule, , Disp: , Rfl:     omeprazole (PRILOSEC) 20 MG capsule, Take 20 mg by mouth once daily. , Disp: , Rfl:     ondansetron (ZOFRAN) 8 MG tablet, Take 8 mg by mouth every 8 (eight) hours., Disp: , Rfl: 0     potassium chloride (MICRO-K) 10 MEQ CpSR, Take 20 mEq by mouth once daily. , Disp: , Rfl:     promethazine (PHENERGAN) 25 MG tablet, Take 25 mg by mouth every 6 (six) hours., Disp: , Rfl: 0    simvastatin (ZOCOR) 40 MG tablet, , Disp: , Rfl:     tamsulosin (FLOMAX) 0.4 mg Cp24, Take 0.4 mg by mouth once daily. , Disp: , Rfl:     venlafaxine (EFFEXOR-XR) 75 MG 24 hr capsule, , Disp: , Rfl:     VENLAFAXINE HCL (VENLAFAXINE ORAL), Take 150 mg by mouth once daily at 6am. , Disp: , Rfl:         Objective:       Physical Examination:     /75   Pulse 96   Temp 98.2 °F (36.8 °C)   Resp 18   Ht 6' (1.829 m)   Wt 128.8 kg (284 lb)   BMI 38.52 kg/m²     Physical Exam   Constitutional: He is oriented to person, place, and time. He appears well-developed and well-nourished.   HENT:   Head: Normocephalic and atraumatic.   Right Ear: External ear normal.   Left Ear: External ear normal.   Mouth/Throat: Oropharynx is clear and moist.   Eyes: Conjunctivae are normal. Pupils are equal, round, and reactive to light. No scleral icterus.   Neck: Normal range of motion. Neck supple.   Cardiovascular: Normal rate, regular rhythm and normal heart sounds.  Exam reveals no gallop and no friction rub.    No murmur heard.  Pulmonary/Chest: Effort normal and breath sounds normal. No respiratory distress. He has no rales. He exhibits no tenderness.   Abdominal: Soft. Bowel sounds are normal. He exhibits no distension and no mass. There is no hepatosplenomegaly. There is no tenderness. There is no rebound and no guarding.   Musculoskeletal: He exhibits no edema.   Lymphadenopathy:        Head (right side): No tonsillar adenopathy present.        Head (left side): No tonsillar adenopathy present.     He has no cervical adenopathy.     He has no axillary adenopathy.        Right: No supraclavicular adenopathy present.        Left: No supraclavicular adenopathy present.   Neurological: He is alert and oriented to person, place, and  time.   Psychiatric: He has a normal mood and affect. His behavior is normal. Judgment and thought content normal.   Vitals reviewed.      Labs:   No results found for this or any previous visit (from the past 336 hour(s)).  CMP  Sodium   Date Value Ref Range Status   10/09/2017 138 134 - 144 mmol/L      Potassium   Date Value Ref Range Status   10/09/2017 3.8 3.5 - 5.0 mmol/L      Chloride   Date Value Ref Range Status   10/09/2017 106 98 - 110 mmol/L      CO2   Date Value Ref Range Status   10/09/2017 22.5 (L) 22.8 - 31.6 mmol/L      Glucose   Date Value Ref Range Status   10/09/2017 98 70 - 99 mg/dL      BUN, Bld   Date Value Ref Range Status   10/09/2017 9 8 - 20 mg/dL      Creatinine   Date Value Ref Range Status   10/09/2017 0.82 0.60 - 1.40 mg/dL      Calcium   Date Value Ref Range Status   10/09/2017 9.3 7.7 - 10.4 mg/dL      Total Protein   Date Value Ref Range Status   10/09/2017 7.9 6.0 - 8.2 g/dL      Albumin   Date Value Ref Range Status   10/09/2017 4.0 3.1 - 4.7 g/dL      Total Bilirubin   Date Value Ref Range Status   10/09/2017 0.6 0.3 - 1.0 mg/dL      Alkaline Phosphatase   Date Value Ref Range Status   10/09/2017 80 40 - 104 IU/L      AST   Date Value Ref Range Status   10/09/2017 30 10 - 40 IU/L      ALT   Date Value Ref Range Status   05/11/2017 50 (H) 10 - 44 U/L Final     Anion Gap   Date Value Ref Range Status   05/11/2017 11 8 - 16 mmol/L Final     eGFR if    Date Value Ref Range Status   05/11/2017 >60 >60 mL/min/1.73 m^2 Final     eGFR if non    Date Value Ref Range Status   05/11/2017 >60 >60 mL/min/1.73 m^2 Final     Comment:     Calculation used to obtain the estimated glomerular filtration  rate (eGFR) is the CKD-EPI equation. Since race is unknown   in our information system, the eGFR values for   -American and Non--American patients are given   for each creatinine result.       No results found for: CEA  No results found for:  PSA        Assessment/Plan:     Problem List Items Addressed This Visit     Malignant neoplasm of sigmoid colon (Chronic)     Patient refused last cycle of chemotherapy, see hpi.  Patient is having some neuropathy as well which is grade II to III so last cycle of oxaliplatin may not be a good idea any way.  Will get scans done in the next month with labs including CEA.  Will get pet scan as there was some uptake on the previous scan.   Patient doing well otherwise.           Relevant Orders    NM PET CT Routine Skull to Mid Thigh    CBC auto differential    Comprehensive metabolic panel    CEA          Discussion:   High complexity patient due to complex chemo regimen and high risk medications.     Return in about 4 weeks (around 11/23/2017).      Electronically signed by Delvin Martinez

## 2017-11-27 ENCOUNTER — HISTORICAL (OUTPATIENT)
Dept: ADMINISTRATIVE | Facility: HOSPITAL | Age: 52
End: 2017-11-27

## 2017-11-27 LAB — PERFORMED BY:: NORMAL

## 2017-11-29 ENCOUNTER — OFFICE VISIT (OUTPATIENT)
Dept: HEMATOLOGY/ONCOLOGY | Facility: CLINIC | Age: 52
End: 2017-11-29
Payer: COMMERCIAL

## 2017-11-29 VITALS
TEMPERATURE: 98 F | SYSTOLIC BLOOD PRESSURE: 145 MMHG | RESPIRATION RATE: 18 BRPM | DIASTOLIC BLOOD PRESSURE: 74 MMHG | HEART RATE: 76 BPM | WEIGHT: 284 LBS | BODY MASS INDEX: 38.52 KG/M2

## 2017-11-29 DIAGNOSIS — C18.7 MALIGNANT NEOPLASM OF SIGMOID COLON: Chronic | ICD-10-CM

## 2017-11-29 PROCEDURE — 99213 OFFICE O/P EST LOW 20 MIN: CPT | Mod: ,,, | Performed by: INTERNAL MEDICINE

## 2017-11-29 NOTE — ASSESSMENT & PLAN NOTE
Patient is doing well.  Is complete therapy and PET scan is negative.  CEA is 2.1 and anemia is improving.  Labs othewise look ok.  Will continue to watch patient with scans again in 3 months then repeat PET in 6 months given the initial hypermetabolism of the ? Lesions in the RLQ.  Discussed all this in detail with the patient today.

## 2018-02-26 LAB
ALBUMIN SERPL-MCNC: 4.9 G/DL (ref 3.1–4.7)
ALP SERPL-CCNC: 70 IU/L (ref 40–104)
ALT (SGPT): 24 IU/L (ref 3–33)
AST SERPL-CCNC: 17 IU/L (ref 10–40)
BASOPHILS NFR BLD: 0 K/UL (ref 0–0.2)
BASOPHILS NFR BLD: 0.5 %
BILIRUB SERPL-MCNC: 0.4 MG/DL (ref 0.3–1)
BUN SERPL-MCNC: 21 MG/DL (ref 8–20)
CALCIUM SERPL-MCNC: 9.6 MG/DL (ref 7.7–10.4)
CEA: 2.1 NG/ML
CHLORIDE: 102 MMOL/L (ref 98–110)
CO2 SERPL-SCNC: 26.5 MMOL/L (ref 22.8–31.6)
CREATININE: 0.89 MG/DL (ref 0.6–1.4)
EOSINOPHIL NFR BLD: 0.3 K/UL (ref 0–0.7)
EOSINOPHIL NFR BLD: 3.9 %
ERYTHROCYTE [DISTWIDTH] IN BLOOD BY AUTOMATED COUNT: 17.2 % (ref 11.7–14.9)
GLUCOSE: 87 MG/DL (ref 70–99)
GRAN #: 6.2 K/UL (ref 1.4–6.5)
GRAN%: 71 %
HCT VFR BLD AUTO: 44.3 % (ref 39–55)
HGB BLD-MCNC: 13.5 G/DL (ref 14–16)
IMMATURE GRANS (ABS): 0.1 K/UL (ref 0–1)
IMMATURE GRANULOCYTES: 0.8 %
LYMPH #: 1.6 K/UL (ref 1.2–3.4)
LYMPH%: 18 %
MCH RBC QN AUTO: 24 PG (ref 25–35)
MCHC RBC AUTO-ENTMCNC: 30.5 G/DL (ref 31–36)
MCV RBC AUTO: 78.7 FL (ref 80–100)
MONO #: 0.5 K/UL (ref 0.1–0.6)
MONO%: 5.8 %
NUCLEATED RBCS: 0 %
PLATELET # BLD AUTO: 248 K/UL (ref 140–440)
PMV BLD AUTO: 10.5 FL (ref 8.8–12.7)
POTASSIUM SERPL-SCNC: 4.6 MMOL/L (ref 3.5–5)
PROT SERPL-MCNC: 8.4 G/DL (ref 6–8.2)
RBC # BLD AUTO: 5.63 M/UL (ref 4.3–5.9)
SODIUM: 137 MMOL/L (ref 134–144)
WBC # BLD AUTO: 8.8 K/UL (ref 5–10)

## 2018-02-28 ENCOUNTER — HISTORICAL (OUTPATIENT)
Dept: ADMINISTRATIVE | Facility: HOSPITAL | Age: 53
End: 2018-02-28

## 2018-02-28 ENCOUNTER — OFFICE VISIT (OUTPATIENT)
Dept: HEMATOLOGY/ONCOLOGY | Facility: CLINIC | Age: 53
End: 2018-02-28
Payer: COMMERCIAL

## 2018-02-28 VITALS
BODY MASS INDEX: 38.37 KG/M2 | WEIGHT: 282.88 LBS | HEART RATE: 92 BPM | DIASTOLIC BLOOD PRESSURE: 78 MMHG | SYSTOLIC BLOOD PRESSURE: 113 MMHG | TEMPERATURE: 98 F

## 2018-02-28 DIAGNOSIS — C18.7 MALIGNANT NEOPLASM OF SIGMOID COLON: Chronic | ICD-10-CM

## 2018-02-28 LAB
ALBUMIN SERPL-MCNC: 4.5 G/DL (ref 3.1–4.7)
ALP SERPL-CCNC: 69 IU/L (ref 40–104)
ALT (SGPT): 24 IU/L (ref 3–33)
AST SERPL-CCNC: 20 IU/L (ref 10–40)
BASOPHILS NFR BLD: 0 K/UL (ref 0–0.2)
BASOPHILS NFR BLD: 0.5 %
BILIRUB SERPL-MCNC: 0.6 MG/DL (ref 0.3–1)
BUN SERPL-MCNC: 14 MG/DL (ref 8–20)
CALCIUM SERPL-MCNC: 9.5 MG/DL (ref 7.7–10.4)
CEA: 2 NG/ML
CHLORIDE: 103 MMOL/L (ref 98–110)
CO2 SERPL-SCNC: 27.2 MMOL/L (ref 22.8–31.6)
CREATININE: 0.79 MG/DL (ref 0.6–1.4)
EOSINOPHIL NFR BLD: 0.3 K/UL (ref 0–0.7)
EOSINOPHIL NFR BLD: 3.7 %
ERYTHROCYTE [DISTWIDTH] IN BLOOD BY AUTOMATED COUNT: 17.2 % (ref 12.5–14.5)
GLUCOSE: 99 MG/DL (ref 70–99)
GRAN #: 4.8 K/UL (ref 1.4–6.5)
GRAN%: 65.6 %
HCT VFR BLD AUTO: 43.5 % (ref 39–55)
HGB BLD-MCNC: 13.3 G/DL (ref 14–16)
IMMATURE GRANS (ABS): 0.1 K/UL (ref 0–1)
IMMATURE GRANULOCYTES: 0.7 %
LYMPH #: 1.6 K/UL (ref 1.2–3.4)
LYMPH%: 21.5 %
MCH RBC QN AUTO: 24 PG (ref 25–35)
MCHC RBC AUTO-ENTMCNC: 30.6 G/DL (ref 31–36)
MCV RBC AUTO: 78.4 FL (ref 80–100)
MONO #: 0.6 K/UL (ref 0.1–0.6)
MONO%: 8 %
NUCLEATED RBCS: 0 %
NUCLEATED RED BLOOD CELLS: 0 /100 WBC
PERFORMED BY:: ABNORMAL
PLATELET # BLD AUTO: 218 K/UL (ref 140–440)
PMV BLD AUTO: 9.1 FL (ref 8.8–12.7)
POTASSIUM SERPL-SCNC: 4.3 MMOL/L (ref 3.5–5)
PROT SERPL-MCNC: 8.1 G/DL (ref 6–8.2)
RBC # BLD AUTO: 5.55 M/UL (ref 4.3–5.9)
SODIUM: 140 MMOL/L (ref 134–144)
WBC # BLD: 7.4 K/UL (ref 5–10)

## 2018-02-28 PROCEDURE — 3008F BODY MASS INDEX DOCD: CPT | Mod: ,,, | Performed by: INTERNAL MEDICINE

## 2018-02-28 PROCEDURE — 99213 OFFICE O/P EST LOW 20 MIN: CPT | Mod: ,,, | Performed by: INTERNAL MEDICINE

## 2018-02-28 NOTE — PROGRESS NOTES
PROGRESS NOTE    Subjective:       Patient ID: Faisal Aguirre III is a 52 y.o. male.    Chief Complaint:  Follow-up  follow up colon cancer    History of Present Illness:   Faisal Aguirre III is a 52 y.o. male who presents with a history of colon cancer.  Patient completed treatment five weeks ago.  No new complaints.        Family and Social history reviewed and is unchanged from 1/25/2017.       ROS:  Review of Systems   Constitutional: Negative for fever and unexpected weight change.   HENT: Negative for nosebleeds.    Respiratory: Negative for chest tightness and shortness of breath.    Cardiovascular: Negative for chest pain.   Gastrointestinal: Negative for abdominal pain and blood in stool.   Genitourinary: Negative for hematuria.   Skin: Negative for rash.   Hematological: Does not bruise/bleed easily.          Current Outpatient Prescriptions:     amlodipine (NORVASC) 10 MG tablet, , Disp: , Rfl:     capecitabine (XELODA) 150 MG tablet, Take 2,500 mg by mouth 2 (two) times daily. Takes for 14 days then off 7 days, Disp: , Rfl:     dronabinol (MARINOL) 10 MG capsule, Take 10 mg by mouth once daily., Disp: , Rfl:     finasteride (PROSCAR) 5 mg tablet, , Disp: , Rfl:     furosemide (LASIX) 40 MG tablet, Take 40 mg by mouth once daily. , Disp: , Rfl:     lactobacillus acidophilus & bulgar (LACTINEX) 100 million cell packet, Take 1 packet (1 each total) by mouth 2 (two) times daily., Disp: , Rfl:     MARINOL 5 mg capsule, , Disp: , Rfl:     omeprazole (PRILOSEC) 20 MG capsule, Take 20 mg by mouth once daily. , Disp: , Rfl:     ondansetron (ZOFRAN) 8 MG tablet, Take 8 mg by mouth every 8 (eight) hours., Disp: , Rfl: 0    potassium chloride (MICRO-K) 10 MEQ CpSR, Take 20 mEq by mouth once daily. , Disp: , Rfl:     promethazine (PHENERGAN) 25 MG tablet, Take 25 mg by mouth every 6 (six) hours., Disp: , Rfl: 0    simvastatin (ZOCOR) 40 MG tablet,  , Disp: , Rfl:     tamsulosin (FLOMAX) 0.4 mg Cp24, Take 0.4 mg by mouth once daily. , Disp: , Rfl:     venlafaxine (EFFEXOR-XR) 75 MG 24 hr capsule, , Disp: , Rfl:     VENLAFAXINE HCL (VENLAFAXINE ORAL), Take 150 mg by mouth once daily at 6am. , Disp: , Rfl:     AMLODIPINE BESYLATE, BULK, MISC, Take 10 mg by mouth once daily at 6am. , Disp: , Rfl:         Objective:       Physical Examination:     /78   Pulse 92   Temp 98.4 °F (36.9 °C)   Wt 128.3 kg (282 lb 14.4 oz)   BMI 38.37 kg/m²     Physical Exam   Constitutional: He is oriented to person, place, and time. He appears well-developed and well-nourished.   HENT:   Head: Normocephalic and atraumatic.   Right Ear: External ear normal.   Left Ear: External ear normal.   Mouth/Throat: Oropharynx is clear and moist.   Eyes: Conjunctivae are normal. Pupils are equal, round, and reactive to light. No scleral icterus.   Neck: Normal range of motion. Neck supple.   Cardiovascular: Normal rate, regular rhythm and normal heart sounds.  Exam reveals no gallop and no friction rub.    No murmur heard.  Pulmonary/Chest: Effort normal and breath sounds normal. No respiratory distress. He has no rales. He exhibits no tenderness.   Abdominal: Soft. Bowel sounds are normal. He exhibits no distension and no mass. There is no hepatosplenomegaly. There is no tenderness. There is no rebound and no guarding.   Musculoskeletal: He exhibits no edema.   Lymphadenopathy:        Head (right side): No tonsillar adenopathy present.        Head (left side): No tonsillar adenopathy present.     He has no cervical adenopathy.     He has no axillary adenopathy.        Right: No supraclavicular adenopathy present.        Left: No supraclavicular adenopathy present.   Neurological: He is alert and oriented to person, place, and time.   Psychiatric: He has a normal mood and affect. His behavior is normal. Judgment and thought content normal.   Vitals reviewed.      Labs:   No results  found for this or any previous visit (from the past 336 hour(s)).  CMP  Sodium   Date Value Ref Range Status   10/09/2017 138 134 - 144 mmol/L      Potassium   Date Value Ref Range Status   10/09/2017 3.8 3.5 - 5.0 mmol/L      Chloride   Date Value Ref Range Status   10/09/2017 106 98 - 110 mmol/L      CO2   Date Value Ref Range Status   10/09/2017 22.5 (L) 22.8 - 31.6 mmol/L      Glucose   Date Value Ref Range Status   10/09/2017 98 70 - 99 mg/dL      BUN, Bld   Date Value Ref Range Status   10/09/2017 9 8 - 20 mg/dL      Creatinine   Date Value Ref Range Status   10/09/2017 0.82 0.60 - 1.40 mg/dL      Calcium   Date Value Ref Range Status   10/09/2017 9.3 7.7 - 10.4 mg/dL      Total Protein   Date Value Ref Range Status   10/09/2017 7.9 6.0 - 8.2 g/dL      Albumin   Date Value Ref Range Status   10/09/2017 4.0 3.1 - 4.7 g/dL      Total Bilirubin   Date Value Ref Range Status   10/09/2017 0.6 0.3 - 1.0 mg/dL      Alkaline Phosphatase   Date Value Ref Range Status   10/09/2017 80 40 - 104 IU/L      AST   Date Value Ref Range Status   10/09/2017 30 10 - 40 IU/L      ALT   Date Value Ref Range Status   05/11/2017 50 (H) 10 - 44 U/L Final     Anion Gap   Date Value Ref Range Status   05/11/2017 11 8 - 16 mmol/L Final     eGFR if    Date Value Ref Range Status   05/11/2017 >60 >60 mL/min/1.73 m^2 Final     eGFR if non    Date Value Ref Range Status   05/11/2017 >60 >60 mL/min/1.73 m^2 Final     Comment:     Calculation used to obtain the estimated glomerular filtration  rate (eGFR) is the CKD-EPI equation. Since race is unknown   in our information system, the eGFR values for   -American and Non--American patients are given   for each creatinine result.       No results found for: CEA  No results found for: PSA        Assessment/Plan:     Problem List Items Addressed This Visit     Malignant neoplasm of sigmoid colon (Chronic)     Patient is doing well.  CT scan of chest,  abdomen and pelvis are normal.  Still need labs done which were ordrerd.  Plan will be PET in 3 months given initial abnormality and will arrange this today.           Relevant Orders    NM PET CT Routine Skull to Mid Thigh    CBC auto differential    Comprehensive metabolic panel    CEA          Discussion:   High complexity patient due to complex chemo regimen and high risk medications.     Follow-up in about 3 months (around 5/28/2018).      Electronically signed by Delvin Martinez

## 2018-02-28 NOTE — ASSESSMENT & PLAN NOTE
Patient is doing well.  CT scan of chest, abdomen and pelvis are normal.  Still need labs done which were ordrerd.  Plan will be PET in 3 months given initial abnormality and will arrange this today.

## 2018-05-31 ENCOUNTER — OFFICE VISIT (OUTPATIENT)
Dept: HEMATOLOGY/ONCOLOGY | Facility: CLINIC | Age: 53
End: 2018-05-31
Payer: COMMERCIAL

## 2018-05-31 VITALS
SYSTOLIC BLOOD PRESSURE: 132 MMHG | HEART RATE: 92 BPM | DIASTOLIC BLOOD PRESSURE: 86 MMHG | BODY MASS INDEX: 39.02 KG/M2 | TEMPERATURE: 98 F | HEIGHT: 72 IN | WEIGHT: 288.13 LBS

## 2018-05-31 DIAGNOSIS — C18.7 MALIGNANT NEOPLASM OF SIGMOID COLON: Chronic | ICD-10-CM

## 2018-05-31 PROCEDURE — 99213 OFFICE O/P EST LOW 20 MIN: CPT | Mod: ,,, | Performed by: INTERNAL MEDICINE

## 2018-05-31 NOTE — PROGRESS NOTES
PROGRESS NOTE    Subjective:       Patient ID: Faisal Aguirre III is a 52 y.o. male.    Chief Complaint:  Follow-up and Results  follow up colon cancer    History of Present Illness:   Faisal Aguirre III is a 52 y.o. male who presents with a history of colon cancer.  Patient completed treatment five weeks ago.  No new complaints.        Family and Social history reviewed and is unchanged from 1/25/2017.       ROS:  Review of Systems   Constitutional: Negative for fever and unexpected weight change.   HENT: Negative for nosebleeds.    Respiratory: Negative for chest tightness and shortness of breath.    Cardiovascular: Negative for chest pain.   Gastrointestinal: Negative for abdominal pain and blood in stool.   Genitourinary: Negative for hematuria.   Skin: Negative for rash.   Hematological: Does not bruise/bleed easily.          Current Outpatient Prescriptions:     amlodipine (NORVASC) 10 MG tablet, , Disp: , Rfl:     AMLODIPINE BESYLATE, BULK, MISC, Take 10 mg by mouth once daily at 6am. , Disp: , Rfl:     capecitabine (XELODA) 150 MG tablet, Take 2,500 mg by mouth 2 (two) times daily. Takes for 14 days then off 7 days, Disp: , Rfl:     dronabinol (MARINOL) 10 MG capsule, Take 10 mg by mouth once daily., Disp: , Rfl:     finasteride (PROSCAR) 5 mg tablet, , Disp: , Rfl:     furosemide (LASIX) 40 MG tablet, Take 40 mg by mouth once daily. , Disp: , Rfl:     lactobacillus acidophilus & bulgar (LACTINEX) 100 million cell packet, Take 1 packet (1 each total) by mouth 2 (two) times daily., Disp: , Rfl:     MARINOL 5 mg capsule, , Disp: , Rfl:     omeprazole (PRILOSEC) 20 MG capsule, Take 20 mg by mouth once daily. , Disp: , Rfl:     ondansetron (ZOFRAN) 8 MG tablet, Take 8 mg by mouth every 8 (eight) hours., Disp: , Rfl: 0    potassium chloride (MICRO-K) 10 MEQ CpSR, Take 20 mEq by mouth once daily. , Disp: , Rfl:     promethazine (PHENERGAN) 25  MG tablet, Take 25 mg by mouth every 6 (six) hours., Disp: , Rfl: 0    simvastatin (ZOCOR) 40 MG tablet, , Disp: , Rfl:     tamsulosin (FLOMAX) 0.4 mg Cp24, Take 0.4 mg by mouth once daily. , Disp: , Rfl:     venlafaxine (EFFEXOR-XR) 75 MG 24 hr capsule, , Disp: , Rfl:     VENLAFAXINE HCL (VENLAFAXINE ORAL), Take 150 mg by mouth once daily at 6am. , Disp: , Rfl:         Objective:       Physical Examination:     /86   Pulse 92   Temp 98 °F (36.7 °C)   Ht 6' (1.829 m)   Wt 130.7 kg (288 lb 1.6 oz)   BMI 39.07 kg/m²     Physical Exam   Constitutional: He is oriented to person, place, and time. He appears well-developed and well-nourished.   HENT:   Head: Normocephalic and atraumatic.   Right Ear: External ear normal.   Left Ear: External ear normal.   Mouth/Throat: Oropharynx is clear and moist.   Eyes: Conjunctivae are normal. Pupils are equal, round, and reactive to light. No scleral icterus.   Neck: Normal range of motion. Neck supple.   Cardiovascular: Normal rate, regular rhythm and normal heart sounds.  Exam reveals no gallop and no friction rub.    No murmur heard.  Pulmonary/Chest: Effort normal and breath sounds normal. No respiratory distress. He has no rales. He exhibits no tenderness.   Abdominal: Soft. Bowel sounds are normal. He exhibits no distension and no mass. There is no hepatosplenomegaly. There is no tenderness. There is no rebound and no guarding.   Musculoskeletal: He exhibits no edema.   Lymphadenopathy:        Head (right side): No tonsillar adenopathy present.        Head (left side): No tonsillar adenopathy present.     He has no cervical adenopathy.     He has no axillary adenopathy.        Right: No supraclavicular adenopathy present.        Left: No supraclavicular adenopathy present.   Neurological: He is alert and oriented to person, place, and time.   Psychiatric: He has a normal mood and affect. His behavior is normal. Judgment and thought content normal.   Vitals  reviewed.      Labs:   No results found for this or any previous visit (from the past 336 hour(s)).  CMP  Sodium   Date Value Ref Range Status   02/28/2018 140 134 - 144 mmol/L      Potassium   Date Value Ref Range Status   02/28/2018 4.3 3.5 - 5.0 mmol/L      Chloride   Date Value Ref Range Status   02/28/2018 103 98 - 110 mmol/L      CO2   Date Value Ref Range Status   02/28/2018 27.2 22.8 - 31.6 mmol/L      Glucose   Date Value Ref Range Status   02/28/2018 99 70 - 99 mg/dL      BUN, Bld   Date Value Ref Range Status   02/28/2018 14 8 - 20 mg/dL      Comment:     Delta Check Reviewed.     Creatinine   Date Value Ref Range Status   02/28/2018 0.79 0.60 - 1.40 mg/dL      Calcium   Date Value Ref Range Status   02/28/2018 9.5 7.7 - 10.4 mg/dL      Total Protein   Date Value Ref Range Status   02/28/2018 8.1 6.0 - 8.2 g/dL      Albumin   Date Value Ref Range Status   02/28/2018 4.5 3.1 - 4.7 g/dL      Total Bilirubin   Date Value Ref Range Status   02/28/2018 0.6 0.3 - 1.0 mg/dL      Alkaline Phosphatase   Date Value Ref Range Status   02/28/2018 69 40 - 104 IU/L      AST   Date Value Ref Range Status   02/28/2018 20 10 - 40 IU/L      ALT   Date Value Ref Range Status   05/11/2017 50 (H) 10 - 44 U/L Final     Anion Gap   Date Value Ref Range Status   05/11/2017 11 8 - 16 mmol/L Final     eGFR if    Date Value Ref Range Status   05/11/2017 >60 >60 mL/min/1.73 m^2 Final     eGFR if non    Date Value Ref Range Status   05/11/2017 >60 >60 mL/min/1.73 m^2 Final     Comment:     Calculation used to obtain the estimated glomerular filtration  rate (eGFR) is the CKD-EPI equation. Since race is unknown   in our information system, the eGFR values for   -American and Non--American patients are given   for each creatinine result.       Lab Results   Component Value Date    CEA 2.0 02/28/2018     No results found for: PSA        Assessment/Plan:     Problem List Items Addressed This  Visit     Malignant neoplasm of sigmoid colon (Chronic)     Patient is doing well.  PET scan is clear of mets and CEA is 2.0.  Labs look good.  He still has a mild anemia but this seems to be slowly improving.  Will have him back in four months with labs and a cxr and plan scans again at that time.           Relevant Orders    CBC auto differential    Comprehensive metabolic panel    X-Ray Chest PA And Lateral    CEA          Discussion:   High complexity patient due to complex chemo regimen and high risk medications.     Follow-up in about 4 months (around 9/30/2018).      Electronically signed by Delvin Martinez

## 2018-05-31 NOTE — ASSESSMENT & PLAN NOTE
Patient is doing well.  PET scan is clear of mets and CEA is 2.0.  Labs look good.  He still has a mild anemia but this seems to be slowly improving.  Will have him back in four months with labs and a cxr and plan scans again at that time.

## 2018-07-17 ENCOUNTER — HOSPITAL ENCOUNTER (EMERGENCY)
Facility: HOSPITAL | Age: 53
Discharge: HOME | End: 2018-07-17

## 2018-07-17 VITALS
RESPIRATION RATE: 20 BRPM | OXYGEN SATURATION: 98 % | BODY MASS INDEX: 38.6 KG/M2 | WEIGHT: 285 LBS | TEMPERATURE: 98 F | DIASTOLIC BLOOD PRESSURE: 98 MMHG | HEIGHT: 72 IN | SYSTOLIC BLOOD PRESSURE: 140 MMHG | HEART RATE: 92 BPM

## 2018-07-17 DIAGNOSIS — B02.9 HERPES ZOSTER WITHOUT COMPLICATION: Primary | ICD-10-CM

## 2018-07-17 PROCEDURE — 99283 EMERGENCY DEPT VISIT LOW MDM: CPT

## 2018-07-17 RX ORDER — IBUPROFEN 800 MG/1
800 TABLET ORAL EVERY 6 HOURS PRN
Qty: 20 TABLET | Refills: 0 | Status: SHIPPED | OUTPATIENT
Start: 2018-07-17

## 2018-07-17 RX ORDER — TRAMADOL HYDROCHLORIDE 50 MG/1
50 TABLET ORAL EVERY 6 HOURS PRN
Qty: 12 TABLET | Refills: 0 | Status: SHIPPED | OUTPATIENT
Start: 2018-07-17 | End: 2018-07-27

## 2018-07-17 RX ORDER — HYDROCODONE BITARTRATE AND ACETAMINOPHEN 5; 325 MG/1; MG/1
1 TABLET ORAL EVERY 6 HOURS PRN
Qty: 12 TABLET | Refills: 0 | Status: SHIPPED | OUTPATIENT
Start: 2018-07-17 | End: 2018-07-27

## 2018-07-17 RX ORDER — ACYCLOVIR 800 MG/1
800 TABLET ORAL
Qty: 35 TABLET | Refills: 0 | Status: SHIPPED | OUTPATIENT
Start: 2018-07-17 | End: 2018-07-24

## 2018-07-18 NOTE — ED PROVIDER NOTES
Encounter Date: 7/17/2018       History     Chief Complaint   Patient presents with    Rash     left side and back     Painful rash to left torso x 3 days            Review of patient's allergies indicates:   Allergen Reactions    Pcn [penicillins] Anaphylaxis     Past Medical History:   Diagnosis Date    Cancer     colon    Colon cancer     Depression     Hypertension      Past Surgical History:   Procedure Laterality Date    BACK SURGERY      lower back and neck surgery    COLON SURGERY      HAND SURGERY      NECK SURGERY       History reviewed. No pertinent family history.  Social History   Substance Use Topics    Smoking status: Current Every Day Smoker     Years: 9.00     Types: Vaping w/o nicotine    Smokeless tobacco: Never Used    Alcohol use Yes     Review of Systems   Constitutional: Negative for fever.   HENT: Negative for sore throat.    Respiratory: Negative for shortness of breath.    Cardiovascular: Negative for chest pain.   Gastrointestinal: Negative for nausea.   Genitourinary: Negative for dysuria.   Musculoskeletal: Negative for back pain.   Skin: Positive for rash.   Neurological: Negative for weakness.   Hematological: Does not bruise/bleed easily.   All other systems reviewed and are negative.      Physical Exam     Initial Vitals [07/17/18 1556]   BP Pulse Resp Temp SpO2   (!) 140/98 92 20 98.1 °F (36.7 °C) 98 %      MAP       --         Physical Exam    Nursing note and vitals reviewed.  Constitutional: He appears well-developed and well-nourished.   HENT:   Head: Normocephalic.   Eyes: Pupils are equal, round, and reactive to light.   Neck: Normal range of motion.   Cardiovascular: Normal rate and regular rhythm.   Pulmonary/Chest: Breath sounds normal.   Neurological: He is alert and oriented to person, place, and time.   Skin: Skin is warm and dry.        Psychiatric: He has a normal mood and affect. His behavior is normal. Judgment and thought content normal.         ED  Course   Procedures  Labs Reviewed - No data to display       Imaging Results    None          Medical Decision Making:   Initial Assessment:   Rash   Differential Diagnosis:   Shingles  ED Management:  Discussed physical exam findings with patient  No acute emergent medication condition identified at this time to warrant further testing/diagnostics.  Plan to discharge patient home with instructions to follow-up with PCP in 2-5 days or return to ED if symptoms worsen.  Patient verbalized agreement to discharge treatment plan.                        Clinical Impression:   The encounter diagnosis was Herpes zoster without complication.      Disposition:   Disposition: Discharged  Condition: Stable                        Bertha Leblanc NP  07/17/18 2104       Bertha Leblanc NP  07/17/18 2105

## 2018-09-21 LAB
ALBUMIN SERPL-MCNC: 4.3 G/DL (ref 3.1–4.7)
ALP SERPL-CCNC: 71 IU/L (ref 40–104)
ALT (SGPT): 30 IU/L (ref 3–33)
AST SERPL-CCNC: 24 IU/L (ref 10–40)
BASOPHILS NFR BLD: 0 K/UL (ref 0–0.2)
BASOPHILS NFR BLD: 0.5 %
BILIRUB SERPL-MCNC: 0.6 MG/DL (ref 0.3–1)
BUN SERPL-MCNC: 13 MG/DL (ref 8–20)
CALCIUM SERPL-MCNC: 9.1 MG/DL (ref 7.7–10.4)
CEA: 1.8 NG/ML
CHLORIDE: 102 MMOL/L (ref 98–110)
CO2 SERPL-SCNC: 24.6 MMOL/L (ref 22.8–31.6)
CREATININE: 0.9 MG/DL (ref 0.6–1.4)
EOSINOPHIL NFR BLD: 0.2 K/UL (ref 0–0.7)
EOSINOPHIL NFR BLD: 3.2 %
ERYTHROCYTE [DISTWIDTH] IN BLOOD BY AUTOMATED COUNT: 14.8 % (ref 11.7–14.9)
GLUCOSE: 97 MG/DL (ref 70–99)
GRAN #: 4.7 K/UL (ref 1.4–6.5)
GRAN%: 64.1 %
HCT VFR BLD AUTO: 40.8 % (ref 39–55)
HGB BLD-MCNC: 13.3 G/DL (ref 14–16)
IMMATURE GRANS (ABS): 0.1 K/UL (ref 0–1)
IMMATURE GRANULOCYTES: 0.7 %
LYMPH #: 1.7 K/UL (ref 1.2–3.4)
LYMPH%: 22.6 %
MCH RBC QN AUTO: 26.5 PG (ref 25–35)
MCHC RBC AUTO-ENTMCNC: 32.6 G/DL (ref 31–36)
MCV RBC AUTO: 81.4 FL (ref 80–100)
MONO #: 0.7 K/UL (ref 0.1–0.6)
MONO%: 8.9 %
NUCLEATED RBCS: 0 %
PLATELET # BLD AUTO: 189 K/UL (ref 140–440)
PMV BLD AUTO: 10.4 FL (ref 8.8–12.7)
POTASSIUM SERPL-SCNC: 3.7 MMOL/L (ref 3.5–5)
PROT SERPL-MCNC: 7.3 G/DL (ref 6–8.2)
RBC # BLD AUTO: 5.01 M/UL (ref 4.3–5.9)
SODIUM: 137 MMOL/L (ref 134–144)
WBC # BLD AUTO: 7.3 K/UL (ref 5–10)

## 2018-09-25 ENCOUNTER — OFFICE VISIT (OUTPATIENT)
Dept: HEMATOLOGY/ONCOLOGY | Facility: CLINIC | Age: 53
End: 2018-09-25
Payer: COMMERCIAL

## 2018-09-25 VITALS
WEIGHT: 301.81 LBS | TEMPERATURE: 98 F | HEIGHT: 72 IN | SYSTOLIC BLOOD PRESSURE: 146 MMHG | HEART RATE: 84 BPM | BODY MASS INDEX: 40.88 KG/M2 | DIASTOLIC BLOOD PRESSURE: 85 MMHG

## 2018-09-25 DIAGNOSIS — C18.7 MALIGNANT NEOPLASM OF SIGMOID COLON: Chronic | ICD-10-CM

## 2018-09-25 PROCEDURE — 99213 OFFICE O/P EST LOW 20 MIN: CPT | Mod: ,,, | Performed by: INTERNAL MEDICINE

## 2018-09-25 RX ORDER — GABAPENTIN 600 MG/1
600 TABLET ORAL 3 TIMES DAILY
COMMUNITY

## 2018-09-25 NOTE — ASSESSMENT & PLAN NOTE
Patient is doing well.  CXR and labs including CEA are normal.  Will continue to monitor him and will get staging scans with next visit.  Patient exam is good and appears JOSSELIN currently.

## 2018-09-25 NOTE — PROGRESS NOTES
PROGRESS NOTE    Subjective:       Patient ID: Faisal Aguirre III is a 53 y.o. male.    Chief Complaint:  Follow-up and Results  follow up colon cancer    History of Present Illness:   Faisal Aguirre III is a 53 y.o. male who presents with a history of colon cancer.  Patient is doing well.  Is feeling better with no new complaints.     Family and Social history reviewed and is unchanged from 1/25/2017.       ROS:  Review of Systems   Constitutional: Negative for fever and unexpected weight change.   HENT: Negative for nosebleeds.    Respiratory: Negative for chest tightness and shortness of breath.    Cardiovascular: Negative for chest pain.   Gastrointestinal: Negative for abdominal pain and blood in stool.   Genitourinary: Negative for hematuria.   Skin: Negative for rash.   Hematological: Does not bruise/bleed easily.          Current Outpatient Medications:     amlodipine (NORVASC) 10 MG tablet, , Disp: , Rfl:     finasteride (PROSCAR) 5 mg tablet, , Disp: , Rfl:     gabapentin (NEURONTIN) 600 MG tablet, Take 600 mg by mouth 3 (three) times daily., Disp: , Rfl:     ibuprofen (ADVIL,MOTRIN) 800 MG tablet, Take 1 tablet (800 mg total) by mouth every 6 (six) hours as needed for Pain., Disp: 20 tablet, Rfl: 0    lactobacillus acidophilus & bulgar (LACTINEX) 100 million cell packet, Take 1 packet (1 each total) by mouth 2 (two) times daily., Disp: , Rfl:     omeprazole (PRILOSEC) 20 MG capsule, Take 20 mg by mouth once daily. , Disp: , Rfl:     simvastatin (ZOCOR) 40 MG tablet, , Disp: , Rfl:     tamsulosin (FLOMAX) 0.4 mg Cp24, Take 0.4 mg by mouth once daily. , Disp: , Rfl:     venlafaxine (EFFEXOR-XR) 75 MG 24 hr capsule, , Disp: , Rfl:     acyclovir (ZOVIRAX) 800 MG Tab, Take 1 tablet (800 mg total) by mouth 5 (five) times daily. for 7 days, Disp: 35 tablet, Rfl: 0        Objective:       Physical Examination:     BP (!) 146/85   Pulse 84    Temp 97.8 °F (36.6 °C)   Ht 6' (1.829 m)   Wt (!) 136.9 kg (301 lb 12.8 oz)   BMI 40.93 kg/m²     Physical Exam   Constitutional: He is oriented to person, place, and time. He appears well-developed and well-nourished.   HENT:   Head: Normocephalic and atraumatic.   Right Ear: External ear normal.   Left Ear: External ear normal.   Mouth/Throat: Oropharynx is clear and moist.   Eyes: Conjunctivae are normal. Pupils are equal, round, and reactive to light. No scleral icterus.   Neck: Normal range of motion. Neck supple.   Cardiovascular: Normal rate, regular rhythm and normal heart sounds. Exam reveals no gallop and no friction rub.   No murmur heard.  Pulmonary/Chest: Effort normal and breath sounds normal. No respiratory distress. He has no rales. He exhibits no tenderness.   Abdominal: Soft. Bowel sounds are normal. He exhibits no distension and no mass. There is no hepatosplenomegaly. There is no tenderness. There is no rebound and no guarding.   Musculoskeletal: He exhibits no edema.   Lymphadenopathy:        Head (right side): No tonsillar adenopathy present.        Head (left side): No tonsillar adenopathy present.     He has no cervical adenopathy.     He has no axillary adenopathy.        Right: No supraclavicular adenopathy present.        Left: No supraclavicular adenopathy present.   Neurological: He is alert and oriented to person, place, and time.   Psychiatric: He has a normal mood and affect. His behavior is normal. Judgment and thought content normal.   Vitals reviewed.      Labs:   Recent Results (from the past 336 hour(s))   CBC auto differential    Collection Time: 09/21/18  9:05 AM   Result Value Ref Range    WBC 7.3 5.0 - 10.0 K/uL    Hemoglobin 13.3 (L) 14.0 - 16.0 g/dL    Hematocrit 40.8 39.0 - 55.0 %    Platelets 189 140 - 440 K/uL     CMP  Sodium   Date Value Ref Range Status   09/21/2018 137 134 - 144 mmol/L      Potassium   Date Value Ref Range Status   09/21/2018 3.7 3.5 - 5.0 mmol/L       Chloride   Date Value Ref Range Status   09/21/2018 102 98 - 110 mmol/L      CO2   Date Value Ref Range Status   09/21/2018 24.6 22.8 - 31.6 mmol/L      Glucose   Date Value Ref Range Status   09/21/2018 97 70 - 99 mg/dL      BUN, Bld   Date Value Ref Range Status   09/21/2018 13 8 - 20 mg/dL      Creatinine   Date Value Ref Range Status   09/21/2018 0.90 0.60 - 1.40 mg/dL      Calcium   Date Value Ref Range Status   09/21/2018 9.1 7.7 - 10.4 mg/dL      Total Protein   Date Value Ref Range Status   09/21/2018 7.3 6.0 - 8.2 g/dL      Albumin   Date Value Ref Range Status   09/21/2018 4.3 3.1 - 4.7 g/dL      Total Bilirubin   Date Value Ref Range Status   09/21/2018 0.6 0.3 - 1.0 mg/dL      Alkaline Phosphatase   Date Value Ref Range Status   09/21/2018 71 40 - 104 IU/L      AST   Date Value Ref Range Status   09/21/2018 24 10 - 40 IU/L      ALT   Date Value Ref Range Status   05/11/2017 50 (H) 10 - 44 U/L Final     Anion Gap   Date Value Ref Range Status   05/11/2017 11 8 - 16 mmol/L Final     eGFR if    Date Value Ref Range Status   05/11/2017 >60 >60 mL/min/1.73 m^2 Final     eGFR if non    Date Value Ref Range Status   05/11/2017 >60 >60 mL/min/1.73 m^2 Final     Comment:     Calculation used to obtain the estimated glomerular filtration  rate (eGFR) is the CKD-EPI equation. Since race is unknown   in our information system, the eGFR values for   -American and Non--American patients are given   for each creatinine result.       Lab Results   Component Value Date    CEA 1.8 09/21/2018     No results found for: PSA        Assessment/Plan:     Problem List Items Addressed This Visit     Malignant neoplasm of sigmoid colon (Chronic)     Patient is doing well.  CXR and labs including CEA are normal.  Will continue to monitor him and will get staging scans with next visit.  Patient exam is good and appears JOSSELIN currently.                     Follow-up in about 4 months  (around 1/25/2019).      Electronically signed by Delvin Martinez

## 2019-01-21 LAB
ALBUMIN SERPL-MCNC: 4.4 G/DL (ref 3.1–4.7)
ALP SERPL-CCNC: 69 IU/L (ref 40–104)
ALT (SGPT): 45 IU/L (ref 3–33)
AST SERPL-CCNC: 25 IU/L (ref 10–40)
BASOPHILS NFR BLD: 0.1 K/UL (ref 0–0.2)
BASOPHILS NFR BLD: 0.6 %
BILIRUB SERPL-MCNC: 0.4 MG/DL (ref 0.3–1)
BUN SERPL-MCNC: 12 MG/DL (ref 8–20)
CALCIUM SERPL-MCNC: 9.5 MG/DL (ref 7.7–10.4)
CEA: 2.2 NG/ML
CHLORIDE: 103 MMOL/L (ref 98–110)
CO2 SERPL-SCNC: 29.4 MMOL/L (ref 22.8–31.6)
CREATININE: 0.93 MG/DL (ref 0.6–1.4)
EOSINOPHIL NFR BLD: 0.3 K/UL (ref 0–0.7)
EOSINOPHIL NFR BLD: 3.6 %
ERYTHROCYTE [DISTWIDTH] IN BLOOD BY AUTOMATED COUNT: 14.4 % (ref 11.7–14.9)
GLUCOSE: 93 MG/DL (ref 70–99)
GRAN #: 5.6 K/UL (ref 1.4–6.5)
GRAN%: 67.7 %
HCT VFR BLD AUTO: 48.1 % (ref 39–55)
HGB BLD-MCNC: 15.1 G/DL (ref 14–16)
IMMATURE GRANS (ABS): 0.1 K/UL (ref 0–1)
IMMATURE GRANULOCYTES: 0.8 %
LYMPH #: 1.5 K/UL (ref 1.2–3.4)
LYMPH%: 18.4 %
MCH RBC QN AUTO: 27.2 PG (ref 25–35)
MCHC RBC AUTO-ENTMCNC: 31.4 G/DL (ref 31–36)
MCV RBC AUTO: 86.7 FL (ref 80–100)
MONO #: 0.7 K/UL (ref 0.1–0.6)
MONO%: 8.9 %
NUCLEATED RBCS: 0 %
PLATELET # BLD AUTO: 214 K/UL (ref 140–440)
PMV BLD AUTO: 10.4 FL (ref 8.8–12.7)
POTASSIUM SERPL-SCNC: 4.5 MMOL/L (ref 3.5–5)
PROT SERPL-MCNC: 8 G/DL (ref 6–8.2)
RBC # BLD AUTO: 5.55 M/UL (ref 4.3–5.9)
SODIUM: 141 MMOL/L (ref 134–144)
WBC # BLD AUTO: 8.3 K/UL (ref 5–10)

## 2019-01-22 LAB — ISTAT CREATININE: 1 MG/DL (ref 0.6–1.4)

## 2019-01-23 ENCOUNTER — OFFICE VISIT (OUTPATIENT)
Dept: HEMATOLOGY/ONCOLOGY | Facility: CLINIC | Age: 54
End: 2019-01-23
Payer: COMMERCIAL

## 2019-01-23 VITALS
SYSTOLIC BLOOD PRESSURE: 157 MMHG | HEART RATE: 98 BPM | WEIGHT: 312.19 LBS | BODY MASS INDEX: 42.34 KG/M2 | RESPIRATION RATE: 20 BRPM | DIASTOLIC BLOOD PRESSURE: 103 MMHG | TEMPERATURE: 98 F

## 2019-01-23 DIAGNOSIS — C18.7 MALIGNANT NEOPLASM OF SIGMOID COLON: Chronic | ICD-10-CM

## 2019-01-23 PROCEDURE — 99213 OFFICE O/P EST LOW 20 MIN: CPT | Mod: ,,, | Performed by: INTERNAL MEDICINE

## 2019-01-23 PROCEDURE — 99213 PR OFFICE/OUTPT VISIT, EST, LEVL III, 20-29 MIN: ICD-10-PCS | Mod: ,,, | Performed by: INTERNAL MEDICINE

## 2019-01-23 NOTE — ASSESSMENT & PLAN NOTE
Patient is doing well and appears JOSSELIN at this time.  CT scans of the chest/a/p are negative and labs including CEA are unremarkable.  Will continue to see patient on a six month cycle with scans and labs and discussed this today.

## 2019-01-23 NOTE — LETTER
January 23, 2019      [facility]  [address] [place] [address]           [facility] - Hematology Oncology  [address]  [address]  [place] [place] [address]  Phone: [phone]  Fax: [phone]          Patient: Faisal Aguirre III   MR Number: [id number]   YOB: 1965   Date of Visit: 1/23/2019       Dear [facility]:    Thank you for referring Faisal Aguirre III to me for evaluation. Attached you will find relevant portions of my assessment and plan of care.    If you have questions, please do not hesitate to call me. I look forward to following Faisal Aguirre III along with you.    Sincerely,    Delvin Rainey MD    Enclosure  CC:  No Recipients    If you would like to receive this communication electronically, please contact [email or web address] or [phone] to request more information on [employer] Link access.    For providers and/or their staff who would like to refer a patient to [facility], please contact us through our one-stop-shop provider referral line, [facility] , at [phone].    If you feel you have received this communication in error or would no longer like to receive these types of communications, please e-mail [email or web address]

## 2019-01-23 NOTE — PROGRESS NOTES
PROGRESS NOTE    Subjective:       Patient ID: Faisal Aguirre III is a 53 y.o. male.    Chief Complaint:  Results and Follow-up  follow up colon cancer    History of Present Illness:   Faisal Aguirre III is a 53 y.o. male who presents with a history of colon cancer.  Patient has no new complaints.        Family and Social history reviewed and is unchanged from 1/25/2017.       ROS:  Review of Systems   Constitutional: Negative for fever and unexpected weight change.   HENT: Negative for nosebleeds.    Respiratory: Negative for chest tightness and shortness of breath.    Cardiovascular: Negative for chest pain.   Gastrointestinal: Negative for abdominal pain and blood in stool.   Genitourinary: Negative for hematuria.   Skin: Negative for rash.   Hematological: Does not bruise/bleed easily.          Current Outpatient Medications:     amlodipine (NORVASC) 10 MG tablet, , Disp: , Rfl:     finasteride (PROSCAR) 5 mg tablet, , Disp: , Rfl:     gabapentin (NEURONTIN) 600 MG tablet, Take 600 mg by mouth 3 (three) times daily., Disp: , Rfl:     ibuprofen (ADVIL,MOTRIN) 800 MG tablet, Take 1 tablet (800 mg total) by mouth every 6 (six) hours as needed for Pain., Disp: 20 tablet, Rfl: 0    lactobacillus acidophilus & bulgar (LACTINEX) 100 million cell packet, Take 1 packet (1 each total) by mouth 2 (two) times daily., Disp: , Rfl:     omeprazole (PRILOSEC) 20 MG capsule, Take 20 mg by mouth once daily. , Disp: , Rfl:     simvastatin (ZOCOR) 40 MG tablet, , Disp: , Rfl:     tamsulosin (FLOMAX) 0.4 mg Cp24, Take 0.4 mg by mouth once daily. , Disp: , Rfl:     venlafaxine (EFFEXOR-XR) 75 MG 24 hr capsule, , Disp: , Rfl:     acyclovir (ZOVIRAX) 800 MG Tab, Take 1 tablet (800 mg total) by mouth 5 (five) times daily. for 7 days, Disp: 35 tablet, Rfl: 0        Objective:       Physical Examination:     BP (!) 157/103   Pulse 98   Temp 97.9 °F (36.6 °C)   Resp 20    Wt (!) 141.6 kg (312 lb 3.2 oz)   BMI 42.34 kg/m²     Physical Exam   Constitutional: He is oriented to person, place, and time. He appears well-developed and well-nourished.   HENT:   Head: Normocephalic and atraumatic.   Right Ear: External ear normal.   Left Ear: External ear normal.   Mouth/Throat: Oropharynx is clear and moist.   Eyes: Conjunctivae are normal. Pupils are equal, round, and reactive to light. No scleral icterus.   Neck: Normal range of motion. Neck supple.   Cardiovascular: Normal rate, regular rhythm and normal heart sounds. Exam reveals no gallop and no friction rub.   No murmur heard.  Pulmonary/Chest: Effort normal and breath sounds normal. No respiratory distress. He has no rales. He exhibits no tenderness.   Abdominal: Soft. Bowel sounds are normal. He exhibits no distension and no mass. There is no hepatosplenomegaly. There is no tenderness. There is no rebound and no guarding.   Musculoskeletal: He exhibits no edema.   Lymphadenopathy:        Head (right side): No tonsillar adenopathy present.        Head (left side): No tonsillar adenopathy present.     He has no cervical adenopathy.     He has no axillary adenopathy.        Right: No supraclavicular adenopathy present.        Left: No supraclavicular adenopathy present.   Neurological: He is alert and oriented to person, place, and time.   Psychiatric: He has a normal mood and affect. His behavior is normal. Judgment and thought content normal.   Vitals reviewed.      Labs:   No results found for this or any previous visit (from the past 336 hour(s)).  CMP  Sodium   Date Value Ref Range Status   09/21/2018 137 134 - 144 mmol/L      Potassium   Date Value Ref Range Status   09/21/2018 3.7 3.5 - 5.0 mmol/L      Chloride   Date Value Ref Range Status   09/21/2018 102 98 - 110 mmol/L      CO2   Date Value Ref Range Status   09/21/2018 24.6 22.8 - 31.6 mmol/L      Glucose   Date Value Ref Range Status   09/21/2018 97 70 - 99 mg/dL       BUN, Bld   Date Value Ref Range Status   09/21/2018 13 8 - 20 mg/dL      Creatinine   Date Value Ref Range Status   09/21/2018 0.90 0.60 - 1.40 mg/dL      Calcium   Date Value Ref Range Status   09/21/2018 9.1 7.7 - 10.4 mg/dL      Total Protein   Date Value Ref Range Status   09/21/2018 7.3 6.0 - 8.2 g/dL      Albumin   Date Value Ref Range Status   09/21/2018 4.3 3.1 - 4.7 g/dL      Total Bilirubin   Date Value Ref Range Status   09/21/2018 0.6 0.3 - 1.0 mg/dL      Alkaline Phosphatase   Date Value Ref Range Status   09/21/2018 71 40 - 104 IU/L      AST   Date Value Ref Range Status   09/21/2018 24 10 - 40 IU/L      ALT   Date Value Ref Range Status   05/11/2017 50 (H) 10 - 44 U/L Final     Anion Gap   Date Value Ref Range Status   05/11/2017 11 8 - 16 mmol/L Final     eGFR if    Date Value Ref Range Status   05/11/2017 >60 >60 mL/min/1.73 m^2 Final     eGFR if non    Date Value Ref Range Status   05/11/2017 >60 >60 mL/min/1.73 m^2 Final     Comment:     Calculation used to obtain the estimated glomerular filtration  rate (eGFR) is the CKD-EPI equation. Since race is unknown   in our information system, the eGFR values for   -American and Non--American patients are given   for each creatinine result.       Lab Results   Component Value Date    CEA 1.8 09/21/2018     No results found for: PSA        Assessment/Plan:     Problem List Items Addressed This Visit     Malignant neoplasm of sigmoid colon (Chronic)     Patient is doing well and appears JOSSELIN at this time.  CT scans of the chest/a/p are negative and labs including CEA are unremarkable.  Will continue to see patient on a six month cycle with scans and labs and discussed this today.           Relevant Orders    CEA    CBC auto differential    Comprehensive metabolic panel    CT Abdomen Pelvis With Contrast    CT Chest With Contrast              Follow-up in about 6 months (around 7/23/2019).      Electronically  signed by Delvin Martinez

## 2019-07-16 LAB — ISTAT CREATININE: 0.9 MG/DL (ref 0.6–1.4)

## 2019-07-22 ENCOUNTER — OFFICE VISIT (OUTPATIENT)
Dept: HEMATOLOGY/ONCOLOGY | Facility: CLINIC | Age: 54
End: 2019-07-22
Payer: COMMERCIAL

## 2019-07-22 VITALS
WEIGHT: 315 LBS | SYSTOLIC BLOOD PRESSURE: 154 MMHG | HEART RATE: 94 BPM | TEMPERATURE: 98 F | RESPIRATION RATE: 18 BRPM | DIASTOLIC BLOOD PRESSURE: 103 MMHG | BODY MASS INDEX: 43.11 KG/M2

## 2019-07-22 DIAGNOSIS — C18.7 MALIGNANT NEOPLASM OF SIGMOID COLON: ICD-10-CM

## 2019-07-22 LAB
ALBUMIN SERPL-MCNC: 4.4 G/DL (ref 3.1–4.7)
ALP SERPL-CCNC: 75 IU/L (ref 40–104)
ALT (SGPT): 40 IU/L (ref 3–33)
AST SERPL-CCNC: 22 IU/L (ref 10–40)
BASOPHILS NFR BLD: 0 K/UL (ref 0–0.2)
BASOPHILS NFR BLD: 0.5 %
BILIRUB SERPL-MCNC: 0.2 MG/DL (ref 0.3–1)
BUN SERPL-MCNC: 16 MG/DL (ref 8–20)
CALCIUM SERPL-MCNC: 9.8 MG/DL (ref 7.7–10.4)
CEA: 2.1 NG/ML
CHLORIDE: 107 MMOL/L (ref 98–110)
CO2 SERPL-SCNC: 27.3 MMOL/L (ref 22.8–31.6)
CREATININE: 0.86 MG/DL (ref 0.6–1.4)
EOSINOPHIL NFR BLD: 0.3 K/UL (ref 0–0.7)
EOSINOPHIL NFR BLD: 2.9 %
ERYTHROCYTE [DISTWIDTH] IN BLOOD BY AUTOMATED COUNT: 13.3 % (ref 12.5–14.5)
GLUCOSE: 101 MG/DL (ref 70–99)
GRAN #: 6 K/UL (ref 1.4–6.5)
GRAN%: 70.5 %
HCT VFR BLD AUTO: 43.5 % (ref 39–55)
HGB BLD-MCNC: 14.2 G/DL (ref 14–16)
IMMATURE GRANS (ABS): 0.1 K/UL (ref 0–1)
IMMATURE GRANULOCYTES: 1.2 %
LYMPH #: 1.4 K/UL (ref 1.2–3.4)
LYMPH%: 16.8 %
MCH RBC QN AUTO: 27.7 PG (ref 25–35)
MCHC RBC AUTO-ENTMCNC: 32.6 G/DL (ref 31–36)
MCV RBC AUTO: 84.8 FL (ref 80–100)
MONO #: 0.7 K/UL (ref 0.1–0.6)
MONO%: 8.1 %
NUCLEATED RBCS: 0 %
NUCLEATED RED BLOOD CELLS: 0 /100 WBC
PERFORMED BY:: ABNORMAL
PLATELET # BLD AUTO: 187 K/UL (ref 140–440)
PMV BLD AUTO: 9.4 FL (ref 8.8–12.7)
POTASSIUM SERPL-SCNC: 4.3 MMOL/L (ref 3.5–5)
PROT SERPL-MCNC: 7.7 G/DL (ref 6–8.2)
RBC # BLD AUTO: 5.13 M/UL (ref 4.3–5.9)
SODIUM: 142 MMOL/L (ref 134–144)
WBC # BLD: 8.5 K/UL (ref 5–10)

## 2019-07-22 PROCEDURE — 99213 OFFICE O/P EST LOW 20 MIN: CPT | Mod: ,,, | Performed by: INTERNAL MEDICINE

## 2019-07-22 PROCEDURE — 99213 PR OFFICE/OUTPT VISIT, EST, LEVL III, 20-29 MIN: ICD-10-PCS | Mod: ,,, | Performed by: INTERNAL MEDICINE

## 2019-07-22 NOTE — PROGRESS NOTES
PROGRESS NOTE    Subjective:       Patient ID: Faisal Aguirre III is a 53 y.o. male.    Chief Complaint:  Follow-up and Results  follow up colon cancer    History of Present Illness:   Faisal Aguirre III is a 53 y.o. male who presents with a history of colon cancer.  Patient has no new complaints.        Family and Social history reviewed and is unchanged from 1/25/2017.       ROS:  Review of Systems   Constitutional: Negative for fever and unexpected weight change.   HENT: Negative for nosebleeds.    Respiratory: Negative for chest tightness and shortness of breath.    Cardiovascular: Negative for chest pain.   Gastrointestinal: Negative for abdominal pain and blood in stool.   Genitourinary: Negative for hematuria.   Skin: Negative for rash.   Hematological: Does not bruise/bleed easily.          Current Outpatient Medications:     amlodipine (NORVASC) 10 MG tablet, , Disp: , Rfl:     finasteride (PROSCAR) 5 mg tablet, , Disp: , Rfl:     gabapentin (NEURONTIN) 600 MG tablet, Take 600 mg by mouth 3 (three) times daily., Disp: , Rfl:     ibuprofen (ADVIL,MOTRIN) 800 MG tablet, Take 1 tablet (800 mg total) by mouth every 6 (six) hours as needed for Pain., Disp: 20 tablet, Rfl: 0    lactobacillus acidophilus & bulgar (LACTINEX) 100 million cell packet, Take 1 packet (1 each total) by mouth 2 (two) times daily., Disp: , Rfl:     omeprazole (PRILOSEC) 20 MG capsule, Take 20 mg by mouth once daily. , Disp: , Rfl:     simvastatin (ZOCOR) 40 MG tablet, , Disp: , Rfl:     tamsulosin (FLOMAX) 0.4 mg Cp24, Take 0.4 mg by mouth once daily. , Disp: , Rfl:     venlafaxine (EFFEXOR-XR) 75 MG 24 hr capsule, , Disp: , Rfl:     acyclovir (ZOVIRAX) 800 MG Tab, Take 1 tablet (800 mg total) by mouth 5 (five) times daily. for 7 days, Disp: 35 tablet, Rfl: 0        Objective:       Physical Examination:     Temp 98.2 °F (36.8 °C)   Resp 18   Wt (!) 144.2 kg (317 lb  14.4 oz)   BMI 43.11 kg/m²     Physical Exam   Constitutional: He is oriented to person, place, and time. He appears well-developed and well-nourished.   HENT:   Head: Normocephalic and atraumatic.   Right Ear: External ear normal.   Left Ear: External ear normal.   Mouth/Throat: Oropharynx is clear and moist.   Eyes: Pupils are equal, round, and reactive to light. Conjunctivae are normal. No scleral icterus.   Neck: Normal range of motion. Neck supple.   Cardiovascular: Normal rate, regular rhythm and normal heart sounds. Exam reveals no gallop and no friction rub.   No murmur heard.  Pulmonary/Chest: Effort normal and breath sounds normal. No respiratory distress. He has no rales. He exhibits no tenderness.   Abdominal: Soft. Bowel sounds are normal. He exhibits no distension and no mass. There is no hepatosplenomegaly. There is no tenderness. There is no rebound and no guarding.   Musculoskeletal: He exhibits no edema.   Lymphadenopathy:        Head (right side): No tonsillar adenopathy present.        Head (left side): No tonsillar adenopathy present.     He has no cervical adenopathy.     He has no axillary adenopathy.        Right: No supraclavicular adenopathy present.        Left: No supraclavicular adenopathy present.   Neurological: He is alert and oriented to person, place, and time.   Psychiatric: He has a normal mood and affect. His behavior is normal. Judgment and thought content normal.   Vitals reviewed.      Labs:   No results found for this or any previous visit (from the past 336 hour(s)).  CMP  Sodium   Date Value Ref Range Status   01/21/2019 141 134 - 144 mmol/L      Potassium   Date Value Ref Range Status   01/21/2019 4.5 3.5 - 5.0 mmol/L      Chloride   Date Value Ref Range Status   01/21/2019 103 98 - 110 mmol/L      CO2   Date Value Ref Range Status   01/21/2019 29.4 22.8 - 31.6 mmol/L      Glucose   Date Value Ref Range Status   01/21/2019 93 70 - 99 mg/dL      BUN, Bld   Date Value Ref  Range Status   01/21/2019 12 8 - 20 mg/dL      Creatinine   Date Value Ref Range Status   01/21/2019 0.93 0.60 - 1.40 mg/dL      Calcium   Date Value Ref Range Status   01/21/2019 9.5 7.7 - 10.4 mg/dL      Total Protein   Date Value Ref Range Status   01/21/2019 8.0 6.0 - 8.2 g/dL      Albumin   Date Value Ref Range Status   01/21/2019 4.4 3.1 - 4.7 g/dL      Total Bilirubin   Date Value Ref Range Status   01/21/2019 0.4 0.3 - 1.0 mg/dL      Alkaline Phosphatase   Date Value Ref Range Status   01/21/2019 69 40 - 104 IU/L      AST   Date Value Ref Range Status   01/21/2019 25 10 - 40 IU/L      ALT   Date Value Ref Range Status   05/11/2017 50 (H) 10 - 44 U/L Final     Anion Gap   Date Value Ref Range Status   05/11/2017 11 8 - 16 mmol/L Final     eGFR if    Date Value Ref Range Status   05/11/2017 >60 >60 mL/min/1.73 m^2 Final     eGFR if non    Date Value Ref Range Status   05/11/2017 >60 >60 mL/min/1.73 m^2 Final     Comment:     Calculation used to obtain the estimated glomerular filtration  rate (eGFR) is the CKD-EPI equation. Since race is unknown   in our information system, the eGFR values for   -American and Non--American patients are given   for each creatinine result.       Lab Results   Component Value Date    CEA 2.2 01/21/2019     No results found for: PSA        Assessment/Plan:     Problem List Items Addressed This Visit     Malignant neoplasm of sigmoid colon (Chronic)     Patient is doing well at this time and CT of the abdomen and pelvis is negative for mets or recurrence.  He will go get his CBC and CEA done today as this was not done.  Will continue to follow him on a six months basis and he will need CT scans again at that time.           Relevant Orders    CBC auto differential    Comprehensive metabolic panel    CEA    CBC auto differential    Comprehensive metabolic panel    CT Abdomen Pelvis With Contrast    CEA              Follow up in about 6  months (around 1/22/2020).      Electronically signed by Delvin Martinez

## 2019-07-22 NOTE — ASSESSMENT & PLAN NOTE
Patient is doing well at this time and CT of the abdomen and pelvis is negative for mets or recurrence.  He will go get his CBC and CEA done today as this was not done.  Will continue to follow him on a six months basis and he will need CT scans again at that time.

## 2019-07-22 NOTE — LETTER
July 22, 2019      [facility]  [address] [place] [address]           [facility] - Hematology Oncology  [address]  [address]  [place] [address]  Phone: [phone]  Fax: [phone]          Patient: Faisal Aguirre III   MR Number: [id number]   YOB: 1965   Date of Visit: 7/22/2019       Dear [facility]:    Thank you for referring Faisal Aguirre III to me for evaluation. Attached you will find relevant portions of my assessment and plan of care.    If you have questions, please do not hesitate to call me. I look forward to following Faisal Aguirre III along with you.    Sincerely,    Delvin Rainey MD    Enclosure  CC:  No Recipients    If you would like to receive this communication electronically, please contact [email or web address] or [phone] to request more information on [employer] access.    For providers and/or their staff who would like to refer a patient to [facility], please contact us through our one-stop-shop provider referral line, [facility] , at [phone].    If you feel you have received this communication in error or would no longer like to receive these types of communications, please e-mail [email or web address]

## 2019-07-23 ENCOUNTER — TELEPHONE (OUTPATIENT)
Dept: HEMATOLOGY/ONCOLOGY | Facility: CLINIC | Age: 54
End: 2019-07-23

## 2019-07-23 NOTE — TELEPHONE ENCOUNTER
----- Message from Brandon Melendez RN sent at 7/23/2019 12:22 PM CDT -----      ----- Message -----  From: Delvin Rainey MD  Sent: 7/23/2019  11:31 AM  To: Sissy Kenney LPN, #    Please call the patient, labs look ok

## 2019-07-23 NOTE — TELEPHONE ENCOUNTER
----- Message from Brandon Melendez RN sent at 7/23/2019 12:22 PM CDT -----      ----- Message -----  From: Delvin Rainey MD  Sent: 7/23/2019  11:31 AM  To: Sissy Kenney LPN, #    Please call the patient, labs look ok      Spoke to patient and let him know that all labs are OK.

## 2020-01-23 ENCOUNTER — HOSPITAL ENCOUNTER (OUTPATIENT)
Dept: RADIOLOGY | Facility: HOSPITAL | Age: 55
Discharge: HOME | End: 2020-01-23
Attending: INTERNAL MEDICINE
Payer: COMMERCIAL

## 2020-01-23 LAB
CREAT SERPL-MCNC: 0.8 MG/DL (ref 0.5–1.4)
SAMPLE: NORMAL

## 2020-01-23 PROCEDURE — 74177 CT ABD & PELVIS W/CONTRAST: CPT | Mod: TC,PO

## 2020-01-23 PROCEDURE — 25500020 PHARM REV CODE 255: Mod: PO | Performed by: INTERNAL MEDICINE

## 2020-01-23 RX ADMIN — IOHEXOL 100 ML: 350 INJECTION, SOLUTION INTRAVENOUS at 02:01

## 2020-02-04 ENCOUNTER — OFFICE VISIT (OUTPATIENT)
Dept: HEMATOLOGY/ONCOLOGY | Facility: CLINIC | Age: 55
End: 2020-02-04
Payer: COMMERCIAL

## 2020-02-04 VITALS — SYSTOLIC BLOOD PRESSURE: 157 MMHG | DIASTOLIC BLOOD PRESSURE: 102 MMHG | TEMPERATURE: 98 F | HEART RATE: 90 BPM

## 2020-02-04 DIAGNOSIS — C18.7 MALIGNANT NEOPLASM OF SIGMOID COLON: Chronic | ICD-10-CM

## 2020-02-04 PROCEDURE — 99213 OFFICE O/P EST LOW 20 MIN: CPT | Mod: S$GLB,,, | Performed by: INTERNAL MEDICINE

## 2020-02-04 PROCEDURE — 99213 PR OFFICE/OUTPT VISIT, EST, LEVL III, 20-29 MIN: ICD-10-PCS | Mod: S$GLB,,, | Performed by: INTERNAL MEDICINE
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
## 2020-03-12 NOTE — PROGRESS NOTES
PROGRESS NOTE    Subjective:       Patient ID: Faisal Aguirre III is a 52 y.o. male.    Chief Complaint:  Follow-up and Results (labs in media)      History of Present Illness:   Faisal Aguirre III is a 52 y.o. male who presents with a history of colon cancer.   Tolerated last treatment well.  No new compaints.     Family and Social history reviewed and is unchanged from 1/25/2017.       ROS:  Review of Systems   Constitutional: Negative for fever and unexpected weight change.   HENT: Negative for nosebleeds.    Respiratory: Negative for chest tightness and shortness of breath.    Cardiovascular: Negative for chest pain.   Gastrointestinal: Negative for abdominal pain and blood in stool.   Genitourinary: Negative for hematuria.   Skin: Negative for rash.   Hematological: Does not bruise/bleed easily.          Current Outpatient Prescriptions:     amlodipine (NORVASC) 10 MG tablet, , Disp: , Rfl:     AMLODIPINE BESYLATE, BULK, MISC, Take 10 mg by mouth once daily at 6am. , Disp: , Rfl:     capecitabine (XELODA) 150 MG tablet, Take 2,500 mg by mouth 2 (two) times daily. Takes for 14 days then off 7 days, Disp: , Rfl:     dronabinol (MARINOL) 10 MG capsule, Take 10 mg by mouth once daily., Disp: , Rfl:     finasteride (PROSCAR) 5 mg tablet, , Disp: , Rfl:     furosemide (LASIX) 40 MG tablet, Take 40 mg by mouth once daily. , Disp: , Rfl:     lactobacillus acidophilus & bulgar (LACTINEX) 100 million cell packet, Take 1 packet (1 each total) by mouth 2 (two) times daily., Disp: , Rfl:     MARINOL 5 mg capsule, , Disp: , Rfl:     omeprazole (PRILOSEC) 20 MG capsule, Take 20 mg by mouth once daily. , Disp: , Rfl:     ondansetron (ZOFRAN) 8 MG tablet, Take 8 mg by mouth every 8 (eight) hours., Disp: , Rfl: 0    potassium chloride (MICRO-K) 10 MEQ CpSR, Take 20 mEq by mouth once daily. , Disp: , Rfl:     promethazine (PHENERGAN) 25 MG tablet, Take 25 mg  by mouth every 6 (six) hours., Disp: , Rfl: 0    simvastatin (ZOCOR) 40 MG tablet, , Disp: , Rfl:     tamsulosin (FLOMAX) 0.4 mg Cp24, Take 0.4 mg by mouth once daily. , Disp: , Rfl:     venlafaxine (EFFEXOR-XR) 75 MG 24 hr capsule, , Disp: , Rfl:     VENLAFAXINE HCL (VENLAFAXINE ORAL), Take 150 mg by mouth once daily at 6am. , Disp: , Rfl:         Objective:       Physical Examination:     BP (!) 132/91   Pulse 99   Temp 98 °F (36.7 °C)   Resp 18   Ht 6' (1.829 m)   Wt 130 kg (286 lb 9.6 oz)   BMI 38.87 kg/m²     Physical Exam   Constitutional: He is oriented to person, place, and time. He appears well-developed and well-nourished.   HENT:   Head: Normocephalic and atraumatic.   Right Ear: External ear normal.   Left Ear: External ear normal.   Mouth/Throat: Oropharynx is clear and moist.   Eyes: Conjunctivae are normal. Pupils are equal, round, and reactive to light. No scleral icterus.   Neck: Normal range of motion. Neck supple.   Cardiovascular: Normal rate, regular rhythm and normal heart sounds.  Exam reveals no gallop and no friction rub.    No murmur heard.  Pulmonary/Chest: Effort normal and breath sounds normal. No respiratory distress. He has no rales. He exhibits no tenderness.   Abdominal: Soft. Bowel sounds are normal. He exhibits no distension and no mass. There is no hepatosplenomegaly. There is no tenderness. There is no rebound and no guarding.   Musculoskeletal: He exhibits no edema.   Lymphadenopathy:        Head (right side): No tonsillar adenopathy present.        Head (left side): No tonsillar adenopathy present.     He has no cervical adenopathy.     He has no axillary adenopathy.        Right: No supraclavicular adenopathy present.        Left: No supraclavicular adenopathy present.   Neurological: He is alert and oriented to person, place, and time.   Psychiatric: He has a normal mood and affect. His behavior is normal. Judgment and thought content normal.   Vitals  reviewed.      Labs:   No results found for this or any previous visit (from the past 336 hour(s)).  CMP  Sodium   Date Value Ref Range Status   08/28/2017 138 134 - 144 mmol/L      Potassium   Date Value Ref Range Status   08/28/2017 4.1 3.5 - 5.0 mmol/L      Chloride   Date Value Ref Range Status   08/28/2017 106 98 - 110 mmol/L      CO2   Date Value Ref Range Status   08/28/2017 28.2 22.8 - 31.6 mmol/L      Glucose   Date Value Ref Range Status   08/28/2017 99 70 - 99 mg/dL      BUN, Bld   Date Value Ref Range Status   08/28/2017 14 8 - 20 mg/dL      Creatinine   Date Value Ref Range Status   08/28/2017 0.91 0.60 - 1.40 mg/dL      Calcium   Date Value Ref Range Status   08/28/2017 9.6 7.7 - 10.4 mg/dL      Total Protein   Date Value Ref Range Status   08/28/2017 8.3 (H) 6.0 - 8.2 g/dL      Albumin   Date Value Ref Range Status   08/28/2017 4.5 3.1 - 4.7 g/dL      Total Bilirubin   Date Value Ref Range Status   08/28/2017 0.7 0.3 - 1.0 mg/dL      Alkaline Phosphatase   Date Value Ref Range Status   08/28/2017 85 40 - 104 IU/L      AST   Date Value Ref Range Status   08/28/2017 24 10 - 40 IU/L      ALT   Date Value Ref Range Status   05/11/2017 50 (H) 10 - 44 U/L Final     Anion Gap   Date Value Ref Range Status   05/11/2017 11 8 - 16 mmol/L Final     eGFR if    Date Value Ref Range Status   05/11/2017 >60 >60 mL/min/1.73 m^2 Final     eGFR if non    Date Value Ref Range Status   05/11/2017 >60 >60 mL/min/1.73 m^2 Final     Comment:     Calculation used to obtain the estimated glomerular filtration  rate (eGFR) is the CKD-EPI equation. Since race is unknown   in our information system, the eGFR values for   -American and Non--American patients are given   for each creatinine result.       No results found for: CEA  No results found for: PSA        Assessment/Plan:     Problem List Items Addressed This Visit     Malignant neoplasm of sigmoid colon (Chronic)     Patient  has one more Xelox to go and is tolerating fairly well.  Will plan scans on next visit and watch labs for approximately one month.  Patient exam is negative.             Other Visit Diagnoses    None.         Discussion:   High complexity patient due to complex chemo regimen and high risk medications.     Return in about 2 weeks (around 10/19/2017).      Electronically signed by Delvin Martinez       Normal rate, regular rhythm.  Heart sounds S1, S2.  No murmurs, rubs or gallops.

## 2020-08-03 ENCOUNTER — HOSPITAL ENCOUNTER (OUTPATIENT)
Dept: RADIOLOGY | Facility: HOSPITAL | Age: 55
Discharge: HOME | End: 2020-08-03
Attending: INTERNAL MEDICINE
Payer: COMMERCIAL

## 2020-08-03 DIAGNOSIS — C18.7 MALIGNANT NEOPLASM OF SIGMOID COLON: Chronic | ICD-10-CM

## 2020-08-03 LAB
CREAT SERPL-MCNC: 1.2 MG/DL (ref 0.5–1.4)
SAMPLE: NORMAL

## 2020-08-03 PROCEDURE — 74177 CT ABD & PELVIS W/CONTRAST: CPT | Mod: TC,PO

## 2020-08-03 PROCEDURE — 25500020 PHARM REV CODE 255: Mod: PO | Performed by: INTERNAL MEDICINE

## 2020-08-03 RX ADMIN — IOHEXOL 100 ML: 350 INJECTION, SOLUTION INTRAVENOUS at 02:08

## 2020-08-04 ENCOUNTER — OFFICE VISIT (OUTPATIENT)
Dept: HEMATOLOGY/ONCOLOGY | Facility: CLINIC | Age: 55
End: 2020-08-04
Payer: COMMERCIAL

## 2020-08-04 VITALS
RESPIRATION RATE: 22 BRPM | SYSTOLIC BLOOD PRESSURE: 166 MMHG | TEMPERATURE: 98 F | BODY MASS INDEX: 39.64 KG/M2 | DIASTOLIC BLOOD PRESSURE: 108 MMHG | HEART RATE: 89 BPM | WEIGHT: 292.31 LBS

## 2020-08-04 DIAGNOSIS — T45.1X5A NEUROPATHY DUE TO CHEMOTHERAPEUTIC DRUG: ICD-10-CM

## 2020-08-04 DIAGNOSIS — C18.7 MALIGNANT NEOPLASM OF SIGMOID COLON: Chronic | ICD-10-CM

## 2020-08-04 DIAGNOSIS — I10 HYPERTENSION, UNSPECIFIED TYPE: ICD-10-CM

## 2020-08-04 DIAGNOSIS — G62.0 NEUROPATHY DUE TO CHEMOTHERAPEUTIC DRUG: ICD-10-CM

## 2020-08-04 PROCEDURE — 99214 PR OFFICE/OUTPT VISIT, EST, LEVL IV, 30-39 MIN: ICD-10-PCS | Mod: S$GLB,,, | Performed by: INTERNAL MEDICINE

## 2020-08-04 PROCEDURE — 99214 OFFICE O/P EST MOD 30 MIN: CPT | Mod: S$GLB,,, | Performed by: INTERNAL MEDICINE

## 2020-08-04 NOTE — ASSESSMENT & PLAN NOTE
Patient is doing well and appears JOSSELIN.  He still needs to get his blood work done and will do this in the next week.  I will continue to watch him with scans again in six months then he should be good to go with yearly scans.      Will have him back with me in six months.

## 2020-08-04 NOTE — PROGRESS NOTES
PROGRESS NOTE    Subjective:       Patient ID: Faisal Aguirre III is a 54 y.o. male.    Chief Complaint:  No chief complaint on file.  follow up colon cancer    History of Present Illness:   Faisal Aguirre III is a 54 y.o. male who presents with a history of colon cancer.  Patient has no new complaints.      8/3/2020:  CT abd/p with contrast negative for recurrence.   No labs done    Family and Social history reviewed and is unchanged from 1/25/2017.       ROS:  Review of Systems   Constitutional: Negative for fever and unexpected weight change.   HENT: Negative for nosebleeds.    Respiratory: Negative for chest tightness and shortness of breath.    Cardiovascular: Negative for chest pain.   Gastrointestinal: Negative for abdominal pain and blood in stool.   Genitourinary: Negative for hematuria.   Skin: Negative for rash.   Hematological: Does not bruise/bleed easily.          Current Outpatient Medications:     acyclovir (ZOVIRAX) 800 MG Tab, Take 1 tablet (800 mg total) by mouth 5 (five) times daily. for 7 days, Disp: 35 tablet, Rfl: 0    amlodipine (NORVASC) 10 MG tablet, , Disp: , Rfl:     finasteride (PROSCAR) 5 mg tablet, , Disp: , Rfl:     gabapentin (NEURONTIN) 600 MG tablet, Take 600 mg by mouth 3 (three) times daily., Disp: , Rfl:     ibuprofen (ADVIL,MOTRIN) 800 MG tablet, Take 1 tablet (800 mg total) by mouth every 6 (six) hours as needed for Pain., Disp: 20 tablet, Rfl: 0    lactobacillus acidophilus & bulgar (LACTINEX) 100 million cell packet, Take 1 packet (1 each total) by mouth 2 (two) times daily., Disp: , Rfl:     omeprazole (PRILOSEC) 20 MG capsule, Take 20 mg by mouth once daily. , Disp: , Rfl:     simvastatin (ZOCOR) 40 MG tablet, , Disp: , Rfl:     tamsulosin (FLOMAX) 0.4 mg Cp24, Take 0.4 mg by mouth once daily. , Disp: , Rfl:     venlafaxine (EFFEXOR-XR) 75 MG 24 hr capsule, , Disp: , Rfl:   No current  facility-administered medications for this visit.         Objective:       Physical Examination:     BP (!) 166/108   Pulse 89   Temp 98.1 °F (36.7 °C)   Resp (!) 22   Wt 132.6 kg (292 lb 4.8 oz)   BMI 39.64 kg/m²     Physical Exam  Vitals signs reviewed.   Constitutional:       Appearance: He is well-developed.   HENT:      Head: Normocephalic and atraumatic.      Right Ear: External ear normal.      Left Ear: External ear normal.   Eyes:      General: No scleral icterus.     Conjunctiva/sclera: Conjunctivae normal.      Pupils: Pupils are equal, round, and reactive to light.   Neck:      Musculoskeletal: Normal range of motion and neck supple.   Cardiovascular:      Rate and Rhythm: Normal rate and regular rhythm.      Heart sounds: Normal heart sounds. No murmur. No friction rub. No gallop.    Pulmonary:      Effort: Pulmonary effort is normal. No respiratory distress.      Breath sounds: Normal breath sounds. No rales.   Chest:      Chest wall: No tenderness.   Abdominal:      General: Bowel sounds are normal. There is no distension.      Palpations: Abdomen is soft. There is no mass.      Tenderness: There is no abdominal tenderness. There is no guarding or rebound.   Lymphadenopathy:      Head:      Right side of head: No tonsillar adenopathy.      Left side of head: No tonsillar adenopathy.      Cervical: No cervical adenopathy.      Upper Body:      Right upper body: No supraclavicular adenopathy.      Left upper body: No supraclavicular adenopathy.   Neurological:      Mental Status: He is alert and oriented to person, place, and time.   Psychiatric:         Behavior: Behavior normal.         Thought Content: Thought content normal.         Judgment: Judgment normal.         Labs:   No results found for this or any previous visit (from the past 336 hour(s)).  CMP  Sodium   Date Value Ref Range Status   07/22/2019 142 134 - 144 mmol/L      Potassium   Date Value Ref Range Status   07/22/2019 4.3 3.5 -  5.0 mmol/L      Chloride   Date Value Ref Range Status   07/22/2019 107 98 - 110 mmol/L      CO2   Date Value Ref Range Status   07/22/2019 27.3 22.8 - 31.6 mmol/L      Glucose   Date Value Ref Range Status   07/22/2019 101 (H) 70 - 99 mg/dL      BUN, Bld   Date Value Ref Range Status   07/22/2019 16 8 - 20 mg/dL      Creatinine   Date Value Ref Range Status   07/22/2019 0.86 0.60 - 1.40 mg/dL      Calcium   Date Value Ref Range Status   07/22/2019 9.8 7.7 - 10.4 mg/dL      Total Protein   Date Value Ref Range Status   07/22/2019 7.7 6.0 - 8.2 g/dL      Albumin   Date Value Ref Range Status   07/22/2019 4.4 3.1 - 4.7 g/dL      Total Bilirubin   Date Value Ref Range Status   07/22/2019 0.2 (L) 0.3 - 1.0 mg/dL      Alkaline Phosphatase   Date Value Ref Range Status   07/22/2019 75 40 - 104 IU/L      AST   Date Value Ref Range Status   07/22/2019 22 10 - 40 IU/L      ALT   Date Value Ref Range Status   05/11/2017 50 (H) 10 - 44 U/L Final     Anion Gap   Date Value Ref Range Status   05/11/2017 11 8 - 16 mmol/L Final     eGFR if    Date Value Ref Range Status   05/11/2017 >60 >60 mL/min/1.73 m^2 Final     eGFR if non    Date Value Ref Range Status   05/11/2017 >60 >60 mL/min/1.73 m^2 Final     Comment:     Calculation used to obtain the estimated glomerular filtration  rate (eGFR) is the CKD-EPI equation. Since race is unknown   in our information system, the eGFR values for   -American and Non--American patients are given   for each creatinine result.       Lab Results   Component Value Date    CEA 2.1 07/22/2019     No results found for: PSA        Assessment/Plan:     Problem List Items Addressed This Visit     Malignant neoplasm of sigmoid colon (Chronic)     Patient is doing well and appears JOSSELIN.  He still needs to get his blood work done and will do this in the next week.  I will continue to watch him with scans again in six months then he should be good to go with  yearly scans.      Will have him back with me in six months.           Relevant Orders    CBC auto differential    Comprehensive metabolic panel    CT Abdomen Pelvis With Contrast    X-Ray Chest PA And Lateral    CEA    Neuropathy due to chemotherapeutic drug     Patient is doing ok with this currently.  Still has symptoms but with no significant changes.           Relevant Orders    CBC auto differential    Comprehensive metabolic panel    CT Abdomen Pelvis With Contrast    X-Ray Chest PA And Lateral    CEA    Hypertension     Patient's BP is up a bit today.  He may have some element of white coat Htn.  Patient will watch at night at home and call if this remains up.           Relevant Orders    CBC auto differential    Comprehensive metabolic panel    CT Abdomen Pelvis With Contrast    X-Ray Chest PA And Lateral    CEA              Follow up in about 6 months (around 2/4/2021).      Electronically signed by Delvin Martinez

## 2020-08-04 NOTE — ASSESSMENT & PLAN NOTE
Patient's BP is up a bit today.  He may have some element of white coat Htn.  Patient will watch at night at home and call if this remains up.

## 2020-08-05 ENCOUNTER — LAB VISIT (OUTPATIENT)
Dept: LAB | Facility: HOSPITAL | Age: 55
End: 2020-08-05
Attending: INTERNAL MEDICINE
Payer: COMMERCIAL

## 2020-08-05 DIAGNOSIS — T45.1X5A NEUROPATHY DUE TO CHEMOTHERAPEUTIC DRUG: ICD-10-CM

## 2020-08-05 DIAGNOSIS — G62.0 NEUROPATHY DUE TO CHEMOTHERAPEUTIC DRUG: ICD-10-CM

## 2020-08-05 DIAGNOSIS — C18.7 MALIGNANT NEOPLASM OF SIGMOID COLON: Chronic | ICD-10-CM

## 2020-08-05 DIAGNOSIS — I10 HYPERTENSION, UNSPECIFIED TYPE: ICD-10-CM

## 2020-08-05 LAB
ALBUMIN SERPL BCP-MCNC: 4.5 G/DL (ref 3.5–5.2)
ALP SERPL-CCNC: 70 U/L (ref 55–135)
ALT SERPL W/O P-5'-P-CCNC: 25 U/L (ref 10–44)
ANION GAP SERPL CALC-SCNC: 8 MMOL/L (ref 8–16)
AST SERPL-CCNC: 19 U/L (ref 10–40)
BASOPHILS # BLD AUTO: 0.04 K/UL (ref 0–0.2)
BASOPHILS # BLD AUTO: 0.04 K/UL (ref 0–0.2)
BASOPHILS NFR BLD: 0.5 % (ref 0–1.9)
BASOPHILS NFR BLD: 0.5 % (ref 0–1.9)
BILIRUB SERPL-MCNC: 0.7 MG/DL (ref 0.1–1)
BUN SERPL-MCNC: 11 MG/DL (ref 6–20)
CALCIUM SERPL-MCNC: 10 MG/DL (ref 8.7–10.5)
CEA SERPL-MCNC: 1.8 NG/ML (ref 0–5)
CHLORIDE SERPL-SCNC: 104 MMOL/L (ref 95–110)
CO2 SERPL-SCNC: 28 MMOL/L (ref 23–29)
CREAT SERPL-MCNC: 0.8 MG/DL (ref 0.5–1.4)
DIFFERENTIAL METHOD: ABNORMAL
DIFFERENTIAL METHOD: ABNORMAL
EOSINOPHIL # BLD AUTO: 0.2 K/UL (ref 0–0.5)
EOSINOPHIL # BLD AUTO: 0.2 K/UL (ref 0–0.5)
EOSINOPHIL NFR BLD: 3 % (ref 0–8)
EOSINOPHIL NFR BLD: 3 % (ref 0–8)
ERYTHROCYTE [DISTWIDTH] IN BLOOD BY AUTOMATED COUNT: 13.5 % (ref 11.5–14.5)
ERYTHROCYTE [DISTWIDTH] IN BLOOD BY AUTOMATED COUNT: 13.5 % (ref 11.5–14.5)
EST. GFR  (AFRICAN AMERICAN): >60 ML/MIN/1.73 M^2
EST. GFR  (NON AFRICAN AMERICAN): >60 ML/MIN/1.73 M^2
GLUCOSE SERPL-MCNC: 100 MG/DL (ref 70–110)
HCT VFR BLD AUTO: 42.5 % (ref 40–54)
HCT VFR BLD AUTO: 42.5 % (ref 40–54)
HGB BLD-MCNC: 14.1 G/DL (ref 14–18)
HGB BLD-MCNC: 14.1 G/DL (ref 14–18)
IMM GRANULOCYTES # BLD AUTO: 0.04 K/UL (ref 0–0.04)
IMM GRANULOCYTES # BLD AUTO: 0.04 K/UL (ref 0–0.04)
IMM GRANULOCYTES NFR BLD AUTO: 0.5 % (ref 0–0.5)
IMM GRANULOCYTES NFR BLD AUTO: 0.5 % (ref 0–0.5)
LYMPHOCYTES # BLD AUTO: 1.3 K/UL (ref 1–4.8)
LYMPHOCYTES # BLD AUTO: 1.3 K/UL (ref 1–4.8)
LYMPHOCYTES NFR BLD: 17.1 % (ref 18–48)
LYMPHOCYTES NFR BLD: 17.1 % (ref 18–48)
MCH RBC QN AUTO: 28.1 PG (ref 27–31)
MCH RBC QN AUTO: 28.1 PG (ref 27–31)
MCHC RBC AUTO-ENTMCNC: 33.2 G/DL (ref 32–36)
MCHC RBC AUTO-ENTMCNC: 33.2 G/DL (ref 32–36)
MCV RBC AUTO: 85 FL (ref 82–98)
MCV RBC AUTO: 85 FL (ref 82–98)
MONOCYTES # BLD AUTO: 0.6 K/UL (ref 0.3–1)
MONOCYTES # BLD AUTO: 0.6 K/UL (ref 0.3–1)
MONOCYTES NFR BLD: 7.9 % (ref 4–15)
MONOCYTES NFR BLD: 7.9 % (ref 4–15)
NEUTROPHILS # BLD AUTO: 5.4 K/UL (ref 1.8–7.7)
NEUTROPHILS # BLD AUTO: 5.4 K/UL (ref 1.8–7.7)
NEUTROPHILS NFR BLD: 71 % (ref 38–73)
NEUTROPHILS NFR BLD: 71 % (ref 38–73)
NRBC BLD-RTO: 0 /100 WBC
NRBC BLD-RTO: 0 /100 WBC
PLATELET # BLD AUTO: 174 K/UL (ref 150–350)
PLATELET # BLD AUTO: 174 K/UL (ref 150–350)
PMV BLD AUTO: 9.3 FL (ref 9.2–12.9)
PMV BLD AUTO: 9.3 FL (ref 9.2–12.9)
POTASSIUM SERPL-SCNC: 4.3 MMOL/L (ref 3.5–5.1)
PROT SERPL-MCNC: 7.7 G/DL (ref 6–8.4)
RBC # BLD AUTO: 5.02 M/UL (ref 4.6–6.2)
RBC # BLD AUTO: 5.02 M/UL (ref 4.6–6.2)
SODIUM SERPL-SCNC: 140 MMOL/L (ref 136–145)
WBC # BLD AUTO: 7.6 K/UL (ref 3.9–12.7)
WBC # BLD AUTO: 7.6 K/UL (ref 3.9–12.7)

## 2020-08-05 PROCEDURE — 82378 CARCINOEMBRYONIC ANTIGEN: CPT

## 2020-08-05 PROCEDURE — 36415 COLL VENOUS BLD VENIPUNCTURE: CPT

## 2020-08-05 PROCEDURE — 80053 COMPREHEN METABOLIC PANEL: CPT

## 2020-08-05 PROCEDURE — 85025 COMPLETE CBC W/AUTO DIFF WBC: CPT

## 2020-08-06 ENCOUNTER — TELEPHONE (OUTPATIENT)
Dept: HEMATOLOGY/ONCOLOGY | Facility: CLINIC | Age: 55
End: 2020-08-06

## 2020-08-06 NOTE — TELEPHONE ENCOUNTER
----- Message from Delvin Rainey MD sent at 8/6/2020 10:40 AM CDT -----  Please call the patient regarding his labs which look ok.      Called patient with recent lab results.

## 2020-12-05 ENCOUNTER — HOSPITAL ENCOUNTER (EMERGENCY)
Facility: HOSPITAL | Age: 55
Discharge: HOME | End: 2020-12-05
Attending: FAMILY MEDICINE
Payer: COMMERCIAL

## 2020-12-05 VITALS
TEMPERATURE: 98 F | RESPIRATION RATE: 20 BRPM | SYSTOLIC BLOOD PRESSURE: 151 MMHG | OXYGEN SATURATION: 99 % | WEIGHT: 294 LBS | BODY MASS INDEX: 39.82 KG/M2 | HEIGHT: 72 IN | DIASTOLIC BLOOD PRESSURE: 90 MMHG | HEART RATE: 78 BPM

## 2020-12-05 DIAGNOSIS — T14.90XA TRAUMA: ICD-10-CM

## 2020-12-05 DIAGNOSIS — S61.210A LACERATION OF RIGHT INDEX FINGER WITHOUT FOREIGN BODY WITHOUT DAMAGE TO NAIL, INITIAL ENCOUNTER: Primary | ICD-10-CM

## 2020-12-05 DIAGNOSIS — M79.644 PAIN OF FINGER OF RIGHT HAND: ICD-10-CM

## 2020-12-05 PROCEDURE — 12004 RPR S/N/AX/GEN/TRK7.6-12.5CM: CPT | Mod: F6

## 2020-12-05 PROCEDURE — 99283 EMERGENCY DEPT VISIT LOW MDM: CPT | Mod: 25

## 2020-12-05 RX ORDER — CEPHALEXIN 500 MG/1
500 CAPSULE ORAL EVERY 8 HOURS
Qty: 21 CAPSULE | Refills: 0 | Status: SHIPPED | OUTPATIENT
Start: 2020-12-05 | End: 2020-12-12

## 2020-12-06 NOTE — ED PROVIDER NOTES
Encounter Date: 12/5/2020       History     Chief Complaint   Patient presents with    Laceration     Patient has a laceration to his right index finger from a new knife.     55-year-old male presents to ER with concerns of right index finger laceration s/p knife injury at home PTA; patient was preparing meat to grill prior to watching the [employer] football game, his new knife started to fall and he instinctively reached out to grab it and it cut the palmar side of his index finger near the base, bleeding control with bulky bandage and direct pressure    Past medical/surgical history, allergies & current medications reviewed with patient -- last tetanus was last year    Known SARS-CoV2 exposure:  No  Room:  13    The history is provided by the patient. No  was used.     Review of patient's allergies indicates:   Allergen Reactions    Pcn [penicillins] Anaphylaxis     Past Medical History:   Diagnosis Date    Cancer     colon    Colon cancer     Depression     Hypertension      Past Surgical History:   Procedure Laterality Date    BACK SURGERY      lower back and neck surgery    COLON SURGERY      HAND SURGERY      NECK SURGERY       History reviewed. No pertinent family history.  Social History     Tobacco Use    Smoking status: Current Every Day Smoker     Years: 9.00     Types: Vaping w/o nicotine    Smokeless tobacco: Never Used   Substance Use Topics    Alcohol use: Yes    Drug use: Yes     Types: Marijuana     Review of Systems   Constitutional: Negative for fever.   HENT: Negative for sore throat.    Respiratory: Negative for cough and shortness of breath.    Cardiovascular: Negative for chest pain.   Gastrointestinal: Negative for abdominal pain and nausea.   Genitourinary: Negative for dysuria.   Musculoskeletal: Positive for myalgias. Negative for back pain.   Skin: Positive for wound. Negative for rash.   Neurological: Negative for weakness and headaches.   Hematological: Does  not bruise/bleed easily.   All other systems reviewed and are negative.      Physical Exam     Initial Vitals [12/05/20 1753]   BP Pulse Resp Temp SpO2   (!) 151/90 78 20 98.1 °F (36.7 °C) 99 %      MAP       --         Physical Exam    Nursing note and vitals reviewed.  Constitutional: He is Obese . He does not appear ill. No distress.   AF, VSS   HENT:   Head: Normocephalic and atraumatic.   Right Ear: External ear normal.   Left Ear: External ear normal.   Nose: Nose normal.   Eyes: Lids are normal.   Neck: Neck supple.   Cardiovascular: Normal rate.   Pulses:       Radial pulses are 2+ on the right side and 2+ on the left side.   Pulmonary/Chest: Effort normal. No respiratory distress.   Abdominal: He exhibits no distension.   Musculoskeletal:        Hands:    Neurological: He is alert.   Skin: Skin is warm. Capillary refill takes less than 2 seconds. Laceration noted. No rash noted.   Psychiatric: He has a normal mood and affect.         ED Course   Lac Repair    Date/Time: 12/5/2020 6:51 PM  Performed by: Ryan Jay NP  Authorized by: Juan Zurita MD     Consent:     Consent obtained:  Verbal    Consent given by:  Patient    Procedural risks discussed: Yes.  Anesthesia (see MAR for exact dosages):     Anesthesia method:  Local infiltration    Local anesthetic:  Lidocaine 1% w/o epi (2 ml)  Laceration details:     Location: Palmar aspect of right index finger.    Length (cm):  8  Repair type:     Repair type:  Simple  Pre-procedure details:     Preparation:  Patient was prepped and draped in usual sterile fashion and imaging obtained to evaluate for foreign bodies  Exploration:     Hemostasis achieved with:  Direct pressure    Wound exploration: wound explored through full range of motion and entire depth of wound probed and visualized      Wound extent: vascular damage (capillary)      Contaminated: Clean.    Treatment:     Area cleansed with:  Betadine    Amount of cleaning:  Standard     Irrigation solution:  Sterile saline    Irrigation method:  Syringe    Foreign body removal: No noted.    Skin repair:     Repair method:  Sutures    Suture size:  5-0    Wound skin closure material used: Ethilon.    Suture technique:  Simple interrupted    Number of sutures:  11  Approximation:     Approximation:  Close  Post-procedure details:     Dressing: Sterile dressing with Coban, bleeding resolved at time of discharge.    Patient tolerance of procedure:  Tolerated well, no immediate complications      Labs Reviewed - No data to display       Imaging Results    None          Medical Decision Making:   ED Management:  Findings, diagnosis & plan of care discussed with patient:  Right index finger trauma/pain/laceration; P will be given Keflex to cover for any possible infection, structure take OTC Motrin/Tylenol as needed for pain, care instructions given -- instructed to follow-up with PCP in 10-14 days for wound check and suture removal    All questions answered, strict return precautions given, patient agrees with plan of care & verbalizes understanding to all instructions, pleasant visit -- vital signs are stable & patient is in no distress at discharge    Disclaimer:  This note was prepared with [employer] voice recognition transcription software. Garbled syntax, mangled pronouns, and other bizarre constructions may be attributed to that software system.  Should there be any questions do not hesitate to contact me for clarification.                               Clinical Impression:       ICD-10-CM ICD-9-CM   1. Laceration of right index finger without foreign body without damage to nail, initial encounter  S61.210A 883.0   2. Pain of finger of right hand  M79.644 729.5   3. Trauma  T14.90XA 959.9                          ED Disposition Condition    Discharge Stable        ED Prescriptions     Medication Sig Dispense Start Date End Date Auth. Provider    cephALEXin (KEFLEX) 500 MG capsule  Take 1 capsule (500 mg total) by mouth every 8 (eight) hours. for 7 days 21 capsule 12/5/2020 12/12/2020 Ryan Jay NP        Follow-up Information     Follow up With Specialties Details Why Contact Info    Primary care provider  Go to  In 10-14 days for wound check and suture removal                                        Ryan Jay NP  12/05/20 2068       Ryan Jay NP  12/05/20 0919

## 2020-12-06 NOTE — DISCHARGE INSTRUCTIONS
Take all medications as prescribed per pharmacy instructions.      Download the [employer] christina to access your health records & test results.  Please remember that you had a visit to the emergency room today and this does not substitute as primary care services for ongoing management because emergency services is a snap shot in time.  Should you have any worsening condition that requires emergency services do not hesitate to return to the ER.    COVID-19 TESTING  Hot Line [phone]  [address] [place] [address]  [facility]  Hours: 8am-5pm Monday - Friday   8am-noon Saturday - Sunday

## 2021-02-01 ENCOUNTER — HOSPITAL ENCOUNTER (OUTPATIENT)
Dept: RADIOLOGY | Facility: HOSPITAL | Age: 56
Discharge: HOME | End: 2021-02-01
Attending: INTERNAL MEDICINE
Payer: OTHER GOVERNMENT

## 2021-02-01 DIAGNOSIS — T45.1X5A NEUROPATHY DUE TO CHEMOTHERAPEUTIC DRUG: ICD-10-CM

## 2021-02-01 DIAGNOSIS — C18.7 MALIGNANT NEOPLASM OF SIGMOID COLON: Chronic | ICD-10-CM

## 2021-02-01 DIAGNOSIS — I10 HYPERTENSION, UNSPECIFIED TYPE: ICD-10-CM

## 2021-02-01 DIAGNOSIS — G62.0 NEUROPATHY DUE TO CHEMOTHERAPEUTIC DRUG: ICD-10-CM

## 2021-02-01 LAB
CREAT SERPL-MCNC: 1 MG/DL (ref 0.5–1.4)
SAMPLE: NORMAL

## 2021-02-01 PROCEDURE — 25500020 PHARM REV CODE 255: Mod: PO | Performed by: INTERNAL MEDICINE

## 2021-02-01 PROCEDURE — 74177 CT ABD & PELVIS W/CONTRAST: CPT | Mod: TC,PO

## 2021-02-01 RX ADMIN — IOHEXOL 100 ML: 350 INJECTION, SOLUTION INTRAVENOUS at 02:02

## 2021-02-02 ENCOUNTER — OFFICE VISIT (OUTPATIENT)
Dept: HEMATOLOGY/ONCOLOGY | Facility: CLINIC | Age: 56
End: 2021-02-02
Payer: OTHER GOVERNMENT

## 2021-02-02 VITALS
RESPIRATION RATE: 16 BRPM | WEIGHT: 296 LBS | BODY MASS INDEX: 40.09 KG/M2 | DIASTOLIC BLOOD PRESSURE: 96 MMHG | HEIGHT: 72 IN | HEART RATE: 73 BPM | SYSTOLIC BLOOD PRESSURE: 167 MMHG

## 2021-02-02 DIAGNOSIS — C18.7 MALIGNANT NEOPLASM OF SIGMOID COLON: Chronic | ICD-10-CM

## 2021-02-02 PROCEDURE — 99213 PR OFFICE/OUTPT VISIT, EST, LEVL III, 20-29 MIN: ICD-10-PCS | Mod: S$GLB,,, | Performed by: INTERNAL MEDICINE

## 2021-02-02 PROCEDURE — 99213 OFFICE O/P EST LOW 20 MIN: CPT | Mod: S$GLB,,, | Performed by: INTERNAL MEDICINE

## 2021-02-02 RX ORDER — NAPROXEN SODIUM 220 MG/1
TABLET, FILM COATED ORAL
COMMUNITY
Start: 2020-08-18

## 2021-02-02 RX ORDER — CLONIDINE HYDROCHLORIDE 0.2 MG/1
TABLET ORAL
COMMUNITY
Start: 2020-09-18

## 2021-02-02 RX ORDER — LIDOCAINE 50 MG/G
PATCH TOPICAL
COMMUNITY
Start: 2020-08-18

## 2021-02-02 RX ORDER — METFORMIN HYDROCHLORIDE 1000 MG/1
TABLET ORAL
COMMUNITY
Start: 2020-08-18

## 2021-02-02 RX ORDER — CYCLOBENZAPRINE HCL 10 MG
TABLET ORAL
COMMUNITY
Start: 2021-01-20

## 2021-02-02 RX ORDER — DULOXETIN HYDROCHLORIDE 30 MG/1
CAPSULE, DELAYED RELEASE ORAL
COMMUNITY
Start: 2020-08-18

## 2021-07-29 ENCOUNTER — LAB VISIT (OUTPATIENT)
Dept: LAB | Facility: HOSPITAL | Age: 56
End: 2021-07-29
Attending: INTERNAL MEDICINE
Payer: OTHER GOVERNMENT

## 2021-07-29 DIAGNOSIS — C18.7 MALIGNANT NEOPLASM OF SIGMOID COLON: Chronic | ICD-10-CM

## 2021-07-29 LAB
ALBUMIN SERPL BCP-MCNC: 4.3 G/DL (ref 3.5–5.2)
ALP SERPL-CCNC: 81 U/L (ref 55–135)
ALT SERPL W/O P-5'-P-CCNC: 44 U/L (ref 10–44)
ANION GAP SERPL CALC-SCNC: 8 MMOL/L (ref 8–16)
AST SERPL-CCNC: 29 U/L (ref 10–40)
BASOPHILS # BLD AUTO: 0.04 K/UL (ref 0–0.2)
BASOPHILS NFR BLD: 0.6 % (ref 0–1.9)
BILIRUB SERPL-MCNC: 0.8 MG/DL (ref 0.1–1)
BUN SERPL-MCNC: 16 MG/DL (ref 6–20)
CALCIUM SERPL-MCNC: 9.5 MG/DL (ref 8.7–10.5)
CEA SERPL-MCNC: 2.4 NG/ML (ref 0–5)
CHLORIDE SERPL-SCNC: 106 MMOL/L (ref 95–110)
CO2 SERPL-SCNC: 29 MMOL/L (ref 23–29)
CREAT SERPL-MCNC: 1 MG/DL (ref 0.5–1.4)
DIFFERENTIAL METHOD: ABNORMAL
EOSINOPHIL # BLD AUTO: 0.3 K/UL (ref 0–0.5)
EOSINOPHIL NFR BLD: 4.1 % (ref 0–8)
ERYTHROCYTE [DISTWIDTH] IN BLOOD BY AUTOMATED COUNT: 13.2 % (ref 11.5–14.5)
EST. GFR  (AFRICAN AMERICAN): >60 ML/MIN/1.73 M^2
EST. GFR  (NON AFRICAN AMERICAN): >60 ML/MIN/1.73 M^2
GLUCOSE SERPL-MCNC: 118 MG/DL (ref 70–110)
HCT VFR BLD AUTO: 42.7 % (ref 40–54)
HGB BLD-MCNC: 13.4 G/DL (ref 14–18)
IMM GRANULOCYTES # BLD AUTO: 0.04 K/UL (ref 0–0.04)
IMM GRANULOCYTES NFR BLD AUTO: 0.6 % (ref 0–0.5)
LYMPHOCYTES # BLD AUTO: 1.1 K/UL (ref 1–4.8)
LYMPHOCYTES NFR BLD: 17.4 % (ref 18–48)
MCH RBC QN AUTO: 27.2 PG (ref 27–31)
MCHC RBC AUTO-ENTMCNC: 31.4 G/DL (ref 32–36)
MCV RBC AUTO: 87 FL (ref 82–98)
MONOCYTES # BLD AUTO: 0.5 K/UL (ref 0.3–1)
MONOCYTES NFR BLD: 7.2 % (ref 4–15)
NEUTROPHILS # BLD AUTO: 4.4 K/UL (ref 1.8–7.7)
NEUTROPHILS NFR BLD: 70.1 % (ref 38–73)
NRBC BLD-RTO: 0 /100 WBC
PLATELET # BLD AUTO: 224 K/UL (ref 150–450)
PMV BLD AUTO: 10.2 FL (ref 9.2–12.9)
POTASSIUM SERPL-SCNC: 4.1 MMOL/L (ref 3.5–5.1)
PROT SERPL-MCNC: 7.6 G/DL (ref 6–8.4)
RBC # BLD AUTO: 4.93 M/UL (ref 4.6–6.2)
SODIUM SERPL-SCNC: 143 MMOL/L (ref 136–145)
WBC # BLD AUTO: 6.27 K/UL (ref 3.9–12.7)

## 2021-07-29 PROCEDURE — 82378 CARCINOEMBRYONIC ANTIGEN: CPT | Performed by: INTERNAL MEDICINE

## 2021-07-29 PROCEDURE — 85025 COMPLETE CBC W/AUTO DIFF WBC: CPT | Performed by: INTERNAL MEDICINE

## 2021-07-29 PROCEDURE — 80053 COMPREHEN METABOLIC PANEL: CPT | Performed by: INTERNAL MEDICINE

## 2021-07-29 PROCEDURE — 36415 COLL VENOUS BLD VENIPUNCTURE: CPT | Performed by: INTERNAL MEDICINE

## 2021-08-03 ENCOUNTER — OFFICE VISIT (OUTPATIENT)
Dept: HEMATOLOGY/ONCOLOGY | Facility: CLINIC | Age: 56
End: 2021-08-03
Payer: OTHER GOVERNMENT

## 2021-08-03 VITALS
WEIGHT: 315 LBS | HEART RATE: 98 BPM | DIASTOLIC BLOOD PRESSURE: 95 MMHG | RESPIRATION RATE: 20 BRPM | SYSTOLIC BLOOD PRESSURE: 161 MMHG | HEIGHT: 72 IN | BODY MASS INDEX: 42.66 KG/M2

## 2021-08-03 DIAGNOSIS — I26.99 OTHER ACUTE PULMONARY EMBOLISM WITHOUT ACUTE COR PULMONALE: ICD-10-CM

## 2021-08-03 DIAGNOSIS — I10 HYPERTENSION, UNSPECIFIED TYPE: ICD-10-CM

## 2021-08-03 DIAGNOSIS — C18.7 MALIGNANT NEOPLASM OF SIGMOID COLON: Chronic | ICD-10-CM

## 2021-08-03 PROCEDURE — 99214 OFFICE O/P EST MOD 30 MIN: CPT | Mod: S$GLB,,, | Performed by: INTERNAL MEDICINE

## 2021-08-03 PROCEDURE — 99214 PR OFFICE/OUTPT VISIT, EST, LEVL IV, 30-39 MIN: ICD-10-PCS | Mod: S$GLB,,, | Performed by: INTERNAL MEDICINE

## 2022-01-25 ENCOUNTER — HOSPITAL ENCOUNTER (OUTPATIENT)
Dept: RADIOLOGY | Facility: HOSPITAL | Age: 57
Discharge: HOME | End: 2022-01-25
Attending: INTERNAL MEDICINE
Payer: OTHER GOVERNMENT

## 2022-01-25 DIAGNOSIS — C18.7 MALIGNANT NEOPLASM OF SIGMOID COLON: Chronic | ICD-10-CM

## 2022-01-25 LAB
CREAT SERPL-MCNC: 0.9 MG/DL (ref 0.5–1.4)
SAMPLE: NORMAL

## 2022-01-25 PROCEDURE — 25500020 PHARM REV CODE 255: Mod: PO | Performed by: INTERNAL MEDICINE

## 2022-01-25 PROCEDURE — 74177 CT ABD & PELVIS W/CONTRAST: CPT | Mod: TC,PO

## 2022-01-25 RX ADMIN — IOHEXOL 100 ML: 350 INJECTION, SOLUTION INTRAVENOUS at 08:01

## 2022-01-31 ENCOUNTER — LAB VISIT (OUTPATIENT)
Dept: LAB | Facility: HOSPITAL | Age: 57
End: 2022-01-31
Attending: INTERNAL MEDICINE
Payer: OTHER GOVERNMENT

## 2022-01-31 DIAGNOSIS — I26.99 OTHER ACUTE PULMONARY EMBOLISM WITHOUT ACUTE COR PULMONALE: ICD-10-CM

## 2022-01-31 DIAGNOSIS — C18.7 MALIGNANT NEOPLASM OF SIGMOID COLON: Chronic | ICD-10-CM

## 2022-01-31 DIAGNOSIS — I10 HYPERTENSION, UNSPECIFIED TYPE: ICD-10-CM

## 2022-01-31 LAB
ALBUMIN SERPL BCP-MCNC: 4.6 G/DL (ref 3.5–5.2)
ALP SERPL-CCNC: 84 U/L (ref 55–135)
ALT SERPL W/O P-5'-P-CCNC: 38 U/L (ref 10–44)
ANION GAP SERPL CALC-SCNC: 11 MMOL/L (ref 8–16)
AST SERPL-CCNC: 22 U/L (ref 10–40)
BASOPHILS # BLD AUTO: 0.04 K/UL (ref 0–0.2)
BASOPHILS NFR BLD: 0.5 % (ref 0–1.9)
BILIRUB SERPL-MCNC: 0.8 MG/DL (ref 0.1–1)
BUN SERPL-MCNC: 15 MG/DL (ref 6–20)
CALCIUM SERPL-MCNC: 9.4 MG/DL (ref 8.7–10.5)
CEA SERPL-MCNC: 2.5 NG/ML (ref 0–5)
CHLORIDE SERPL-SCNC: 102 MMOL/L (ref 95–110)
CO2 SERPL-SCNC: 27 MMOL/L (ref 23–29)
CREAT SERPL-MCNC: 1 MG/DL (ref 0.5–1.4)
DIFFERENTIAL METHOD: ABNORMAL
EOSINOPHIL # BLD AUTO: 0.2 K/UL (ref 0–0.5)
EOSINOPHIL NFR BLD: 2.1 % (ref 0–8)
ERYTHROCYTE [DISTWIDTH] IN BLOOD BY AUTOMATED COUNT: 13.5 % (ref 11.5–14.5)
EST. GFR  (AFRICAN AMERICAN): >60 ML/MIN/1.73 M^2
EST. GFR  (NON AFRICAN AMERICAN): >60 ML/MIN/1.73 M^2
GLUCOSE SERPL-MCNC: 96 MG/DL (ref 70–110)
HCT VFR BLD AUTO: 41.7 % (ref 40–54)
HGB BLD-MCNC: 13.5 G/DL (ref 14–18)
IMM GRANULOCYTES # BLD AUTO: 0.02 K/UL (ref 0–0.04)
IMM GRANULOCYTES NFR BLD AUTO: 0.3 % (ref 0–0.5)
LYMPHOCYTES # BLD AUTO: 1.5 K/UL (ref 1–4.8)
LYMPHOCYTES NFR BLD: 19.6 % (ref 18–48)
MCH RBC QN AUTO: 27.1 PG (ref 27–31)
MCHC RBC AUTO-ENTMCNC: 32.4 G/DL (ref 32–36)
MCV RBC AUTO: 84 FL (ref 82–98)
MONOCYTES # BLD AUTO: 0.6 K/UL (ref 0.3–1)
MONOCYTES NFR BLD: 7.8 % (ref 4–15)
NEUTROPHILS # BLD AUTO: 5.4 K/UL (ref 1.8–7.7)
NEUTROPHILS NFR BLD: 69.7 % (ref 38–73)
NRBC BLD-RTO: 0 /100 WBC
PLATELET # BLD AUTO: 206 K/UL (ref 150–450)
PMV BLD AUTO: 9.3 FL (ref 9.2–12.9)
POTASSIUM SERPL-SCNC: 3.5 MMOL/L (ref 3.5–5.1)
PROT SERPL-MCNC: 8 G/DL (ref 6–8.4)
RBC # BLD AUTO: 4.99 M/UL (ref 4.6–6.2)
SODIUM SERPL-SCNC: 140 MMOL/L (ref 136–145)
WBC # BLD AUTO: 7.8 K/UL (ref 3.9–12.7)

## 2022-01-31 PROCEDURE — 36415 COLL VENOUS BLD VENIPUNCTURE: CPT | Performed by: INTERNAL MEDICINE

## 2022-01-31 PROCEDURE — 82378 CARCINOEMBRYONIC ANTIGEN: CPT | Performed by: INTERNAL MEDICINE

## 2022-01-31 PROCEDURE — 85025 COMPLETE CBC W/AUTO DIFF WBC: CPT | Performed by: INTERNAL MEDICINE

## 2022-01-31 PROCEDURE — 80053 COMPREHEN METABOLIC PANEL: CPT | Performed by: INTERNAL MEDICINE

## 2022-03-31 ENCOUNTER — OFFICE VISIT (OUTPATIENT)
Dept: HEMATOLOGY/ONCOLOGY | Facility: CLINIC | Age: 57
End: 2022-03-31
Payer: OTHER GOVERNMENT

## 2022-03-31 VITALS
HEART RATE: 72 BPM | DIASTOLIC BLOOD PRESSURE: 95 MMHG | SYSTOLIC BLOOD PRESSURE: 151 MMHG | TEMPERATURE: 97 F | HEIGHT: 72 IN | BODY MASS INDEX: 42.99 KG/M2 | RESPIRATION RATE: 20 BRPM

## 2022-03-31 DIAGNOSIS — C18.7 MALIGNANT NEOPLASM OF SIGMOID COLON: Chronic | ICD-10-CM

## 2022-03-31 PROCEDURE — 99213 OFFICE O/P EST LOW 20 MIN: CPT | Mod: S$GLB,,, | Performed by: INTERNAL MEDICINE

## 2022-03-31 PROCEDURE — 99213 PR OFFICE/OUTPT VISIT, EST, LEVL III, 20-29 MIN: ICD-10-PCS | Mod: S$GLB,,, | Performed by: INTERNAL MEDICINE

## 2022-03-31 NOTE — PROGRESS NOTES
PROGRESS NOTE    Subjective:       Patient ID: Faisal Aguirre III is a 56 y.o. male.    Sigmoid colectomy: 12/9/2016  T3N1M0 Stage III, KRAS mutated, NRAS neg, BRAF neg.   Began Folfox 2/13/2017  S/P 5 cycles Folfox, PORT infection.  Changed to Xelox. 1st cycle June 5th, 2017. Completed 10/2017    1/25/2022:  CT abd/p:  Negative for mets  CEA 2.5    Chief Complaint:  No chief complaint on file.  follow up colon cancer    History of Present Illness:   Faisal Aguirre III is a 56 y.o. male who presents with a history of colon cancer.      Mr. Aguirre is doing well with no new complaints at this time.      Patient is due for colonoscopy.     8/3/2020:  CT abd/p with contrast negative for recurrence.   No labs done    Family and Social history reviewed and is unchanged from 1/25/2017.       ROS:  Review of Systems   Constitutional: Negative for fever and unexpected weight change.   HENT: Negative for nosebleeds.    Respiratory: Negative for chest tightness and shortness of breath.    Cardiovascular: Negative for chest pain.   Gastrointestinal: Negative for abdominal pain and blood in stool.   Genitourinary: Negative for hematuria.   Skin: Negative for rash.   Hematological: Does not bruise/bleed easily.          Current Outpatient Medications:     amlodipine (NORVASC) 10 MG tablet, , Disp: , Rfl:     aspirin 81 MG Chew, CHEW ONE TABLET BY MOUTH DAILY TO PREVENT BLOOD CLOTS, Disp: , Rfl:     cloNIDine (CATAPRES) 0.2 MG tablet, TAKE ONE TABLET BY MOUTH TWICE A DAY FOR HEART AND BLOOD PRESSURE, Disp: , Rfl:     cyclobenzaprine (FLEXERIL) 10 MG tablet, TAKE ONE TABLET BY MOUTH TWICE A DAY AS NEEDED AS A MUSCLE RELAXANT, Disp: , Rfl:     DULoxetine (CYMBALTA) 30 MG capsule, TAKE THREE CAPSULES BY MOUTH DAILY FOR DEPRESSION AND/OR PAIN, Disp: , Rfl:     finasteride (PROSCAR) 5 mg tablet, , Disp: , Rfl:     ibuprofen (ADVIL,MOTRIN) 800 MG tablet, Take 1 tablet  (800 mg total) by mouth every 6 (six) hours as needed for Pain., Disp: 20 tablet, Rfl: 0    metFORMIN (GLUCOPHAGE) 1000 MG tablet, TAKE ONE-HALF TABLET BY MOUTH DAILY FOR DIABETES, Disp: , Rfl:     omeprazole (PRILOSEC) 20 MG capsule, Take 20 mg by mouth once daily. , Disp: , Rfl:     simvastatin (ZOCOR) 40 MG tablet, , Disp: , Rfl:     tamsulosin (FLOMAX) 0.4 mg Cp24, Take 0.4 mg by mouth once daily. , Disp: , Rfl:     acyclovir (ZOVIRAX) 800 MG Tab, Take 1 tablet (800 mg total) by mouth 5 (five) times daily. for 7 days, Disp: 35 tablet, Rfl: 0    gabapentin (NEURONTIN) 600 MG tablet, Take 600 mg by mouth 3 (three) times daily., Disp: , Rfl:     lactobacillus acidophilus & bulgar (LACTINEX) 100 million cell packet, Take 1 packet (1 each total) by mouth 2 (two) times daily., Disp: , Rfl:     lidocaine (LIDODERM) 5 %, APPLY 1 PATCH TOPICALLY DAILY AS NEEDED FOR PAIN, Disp: , Rfl:     venlafaxine (EFFEXOR-XR) 75 MG 24 hr capsule, , Disp: , Rfl:         Objective:       Physical Examination:     BP (!) 151/95   Pulse 72   Temp 97.2 °F (36.2 °C)   Resp 20   Ht 6' (1.829 m)   BMI 42.99 kg/m²     Physical Exam  Vitals reviewed.   Constitutional:       Appearance: He is well-developed.   HENT:      Head: Normocephalic and atraumatic.      Right Ear: External ear normal.      Left Ear: External ear normal.   Eyes:      General: No scleral icterus.     Conjunctiva/sclera: Conjunctivae normal.      Pupils: Pupils are equal, round, and reactive to light.   Cardiovascular:      Rate and Rhythm: Normal rate and regular rhythm.      Heart sounds: Normal heart sounds. No murmur heard.    No friction rub. No gallop.   Pulmonary:      Effort: Pulmonary effort is normal. No respiratory distress.      Breath sounds: Normal breath sounds. No rales.   Chest:      Chest wall: No tenderness.   Breasts:      Right: No supraclavicular adenopathy.      Left: No supraclavicular adenopathy.       Abdominal:      General: Bowel  sounds are normal. There is no distension.      Palpations: Abdomen is soft. There is no mass.      Tenderness: There is no abdominal tenderness. There is no guarding or rebound.   Musculoskeletal:      Cervical back: Normal range of motion and neck supple.   Lymphadenopathy:      Head:      Right side of head: No tonsillar adenopathy.      Left side of head: No tonsillar adenopathy.      Cervical: No cervical adenopathy.      Upper Body:      Right upper body: No supraclavicular adenopathy.      Left upper body: No supraclavicular adenopathy.   Neurological:      Mental Status: He is alert and oriented to person, place, and time.   Psychiatric:         Behavior: Behavior normal.         Thought Content: Thought content normal.         Judgment: Judgment normal.         Labs:   No results found for this or any previous visit (from the past 336 hour(s)).  CMP  Sodium   Date Value Ref Range Status   01/31/2022 140 136 - 145 mmol/L Final   07/22/2019 142 134 - 144 mmol/L      Potassium   Date Value Ref Range Status   01/31/2022 3.5 3.5 - 5.1 mmol/L Final     Chloride   Date Value Ref Range Status   01/31/2022 102 95 - 110 mmol/L Final   07/22/2019 107 98 - 110 mmol/L      CO2   Date Value Ref Range Status   01/31/2022 27 23 - 29 mmol/L Final     Glucose   Date Value Ref Range Status   01/31/2022 96 70 - 110 mg/dL Final   07/22/2019 101 (H) 70 - 99 mg/dL      BUN   Date Value Ref Range Status   01/31/2022 15 6 - 20 mg/dL Final     Creatinine   Date Value Ref Range Status   01/31/2022 1.0 0.5 - 1.4 mg/dL Final   07/22/2019 0.86 0.60 - 1.40 mg/dL      Calcium   Date Value Ref Range Status   01/31/2022 9.4 8.7 - 10.5 mg/dL Final     Total Protein   Date Value Ref Range Status   01/31/2022 8.0 6.0 - 8.4 g/dL Final     Albumin   Date Value Ref Range Status   01/31/2022 4.6 3.5 - 5.2 g/dL Final   07/22/2019 4.4 3.1 - 4.7 g/dL      Total Bilirubin   Date Value Ref Range Status   01/31/2022 0.8 0.1 - 1.0 mg/dL Final      Comment:     For infants and newborns, interpretation of results should be based  on gestational age, weight and in agreement with clinical  observations.    Premature Infant recommended reference ranges:  Up to 24 hours.............<8.0 mg/dL  Up to 48 hours............<12.0 mg/dL  3-5 days..................<15.0 mg/dL  6-29 days.................<15.0 mg/dL       Alkaline Phosphatase   Date Value Ref Range Status   01/31/2022 84 55 - 135 U/L Final     AST   Date Value Ref Range Status   01/31/2022 22 10 - 40 U/L Final     ALT   Date Value Ref Range Status   01/31/2022 38 10 - 44 U/L Final     Anion Gap   Date Value Ref Range Status   01/31/2022 11 8 - 16 mmol/L Final     eGFR if    Date Value Ref Range Status   01/31/2022 >60.0 >60 mL/min/1.73 m^2 Final     eGFR if non    Date Value Ref Range Status   01/31/2022 >60.0 >60 mL/min/1.73 m^2 Final     Comment:     Calculation used to obtain the estimated glomerular filtration  rate (eGFR) is the CKD-EPI equation.        Lab Results   Component Value Date    CEA 2.5 01/31/2022     No results found for: PSA        Assessment/Plan:     Problem List Items Addressed This Visit     Malignant neoplasm of sigmoid colon (Chronic)     Patient is doing well and scans and CEA are negative.  He appears JOSSELIN at this time.  Will continue with every six  Months visits with labs and yearly scans.  Discussed this today.      Patient needs colonoscopy.    Will refer to Dr. Crabtree for this now.             Relevant Orders    CBC Auto Differential    Comprehensive Metabolic Panel    CEA    Ambulatory referral/consult to Gastroenterology              Follow up in about 6 months (around 9/30/2022).      Electronically signed by Delvin Martinez

## 2022-03-31 NOTE — ASSESSMENT & PLAN NOTE
Patient is doing well and scans and CEA are negative.  He appears JOSSELIN at this time.  Will continue with every six  Months visits with labs and yearly scans.  Discussed this today.      Patient needs colonoscopy.    Will refer to Dr. Crabtree for this now.

## 2022-09-21 ENCOUNTER — TELEPHONE (OUTPATIENT)
Dept: HEMATOLOGY/ONCOLOGY | Facility: CLINIC | Age: 57
End: 2022-09-21

## 2022-09-21 NOTE — TELEPHONE ENCOUNTER
Called patient regarding laboratory work for upcomming appointment 09/27/2022, patient stated understanding.

## 2022-09-26 ENCOUNTER — LAB VISIT (OUTPATIENT)
Dept: LAB | Facility: HOSPITAL | Age: 57
End: 2022-09-26
Attending: INTERNAL MEDICINE
Payer: OTHER GOVERNMENT

## 2022-09-26 DIAGNOSIS — C18.7 MALIGNANT NEOPLASM OF SIGMOID COLON: Chronic | ICD-10-CM

## 2022-09-26 LAB
ALBUMIN SERPL BCP-MCNC: 4.4 G/DL (ref 3.5–5.2)
ALP SERPL-CCNC: 82 U/L (ref 55–135)
ALT SERPL W/O P-5'-P-CCNC: 31 U/L (ref 10–44)
ANION GAP SERPL CALC-SCNC: 4 MMOL/L (ref 8–16)
AST SERPL-CCNC: 21 U/L (ref 10–40)
BASOPHILS # BLD AUTO: 0.04 K/UL (ref 0–0.2)
BASOPHILS NFR BLD: 0.6 % (ref 0–1.9)
BILIRUB SERPL-MCNC: 0.8 MG/DL (ref 0.1–1)
BUN SERPL-MCNC: 15 MG/DL (ref 6–20)
CALCIUM SERPL-MCNC: 9.1 MG/DL (ref 8.7–10.5)
CEA SERPL-MCNC: 2.6 NG/ML (ref 0–5)
CHLORIDE SERPL-SCNC: 101 MMOL/L (ref 95–110)
CO2 SERPL-SCNC: 33 MMOL/L (ref 23–29)
CREAT SERPL-MCNC: 0.9 MG/DL (ref 0.5–1.4)
DIFFERENTIAL METHOD: ABNORMAL
EOSINOPHIL # BLD AUTO: 0.2 K/UL (ref 0–0.5)
EOSINOPHIL NFR BLD: 3.1 % (ref 0–8)
ERYTHROCYTE [DISTWIDTH] IN BLOOD BY AUTOMATED COUNT: 13.2 % (ref 11.5–14.5)
EST. GFR  (NO RACE VARIABLE): >60 ML/MIN/1.73 M^2
GLUCOSE SERPL-MCNC: 101 MG/DL (ref 70–110)
HCT VFR BLD AUTO: 38.8 % (ref 40–54)
HGB BLD-MCNC: 13 G/DL (ref 14–18)
IMM GRANULOCYTES # BLD AUTO: 0.05 K/UL (ref 0–0.04)
IMM GRANULOCYTES NFR BLD AUTO: 0.7 % (ref 0–0.5)
LYMPHOCYTES # BLD AUTO: 1.2 K/UL (ref 1–4.8)
LYMPHOCYTES NFR BLD: 17.7 % (ref 18–48)
MCH RBC QN AUTO: 27.8 PG (ref 27–31)
MCHC RBC AUTO-ENTMCNC: 33.5 G/DL (ref 32–36)
MCV RBC AUTO: 83 FL (ref 82–98)
MONOCYTES # BLD AUTO: 0.6 K/UL (ref 0.3–1)
MONOCYTES NFR BLD: 8 % (ref 4–15)
NEUTROPHILS # BLD AUTO: 4.9 K/UL (ref 1.8–7.7)
NEUTROPHILS NFR BLD: 69.9 % (ref 38–73)
NRBC BLD-RTO: 0 /100 WBC
PLATELET # BLD AUTO: 204 K/UL (ref 150–450)
PMV BLD AUTO: 9.4 FL (ref 9.2–12.9)
POTASSIUM SERPL-SCNC: 3.2 MMOL/L (ref 3.5–5.1)
PROT SERPL-MCNC: 7.7 G/DL (ref 6–8.4)
RBC # BLD AUTO: 4.68 M/UL (ref 4.6–6.2)
SODIUM SERPL-SCNC: 138 MMOL/L (ref 136–145)
WBC # BLD AUTO: 7 K/UL (ref 3.9–12.7)

## 2022-09-26 PROCEDURE — 80053 COMPREHEN METABOLIC PANEL: CPT | Performed by: INTERNAL MEDICINE

## 2022-09-26 PROCEDURE — 85025 COMPLETE CBC W/AUTO DIFF WBC: CPT | Performed by: INTERNAL MEDICINE

## 2022-09-26 PROCEDURE — 82378 CARCINOEMBRYONIC ANTIGEN: CPT | Performed by: INTERNAL MEDICINE

## 2022-09-26 PROCEDURE — 36415 COLL VENOUS BLD VENIPUNCTURE: CPT | Performed by: INTERNAL MEDICINE

## 2022-09-27 ENCOUNTER — OFFICE VISIT (OUTPATIENT)
Dept: HEMATOLOGY/ONCOLOGY | Facility: CLINIC | Age: 57
End: 2022-09-27
Payer: OTHER GOVERNMENT

## 2022-09-27 VITALS
WEIGHT: 309.31 LBS | SYSTOLIC BLOOD PRESSURE: 121 MMHG | HEART RATE: 86 BPM | BODY MASS INDEX: 41.95 KG/M2 | TEMPERATURE: 98 F | DIASTOLIC BLOOD PRESSURE: 78 MMHG

## 2022-09-27 DIAGNOSIS — C18.7 MALIGNANT NEOPLASM OF SIGMOID COLON: Primary | ICD-10-CM

## 2022-09-27 PROCEDURE — 99213 OFFICE O/P EST LOW 20 MIN: CPT | Mod: S$GLB,,, | Performed by: NURSE PRACTITIONER

## 2022-09-27 PROCEDURE — 99213 PR OFFICE/OUTPT VISIT, EST, LEVL III, 20-29 MIN: ICD-10-PCS | Mod: S$GLB,,, | Performed by: NURSE PRACTITIONER

## 2022-09-27 NOTE — PROGRESS NOTES
PROGRESS NOTE    Subjective:       Patient ID: Faisal Aguirre III is a 57 y.o. male.    Sigmoid colectomy: 12/9/2016  T3N1M0 Stage III, KRAS mutated, NRAS neg, BRAF neg.   Began Folfox 2/13/2017  S/P 5 cycles Folfox, PORT infection.  Changed to Xelox. 1st cycle June 5th, 2017. Completed 10/2017    1/25/2022:  CT abd/p:  Negative for mets  CEA 2.5    Chief Complaint:  follow up colon cancer    History of Present Illness:   Faisal Aguirre III is a 57 y.o. male who presents with a history of colon cancer.      Mr. Aguirre is doing well with no new complaints at this time.    [facility] of [facility]. Bowels regular. He denies any BRBPR.     1/25/2022   CT CAP  No Evidence recurrent or met cancer    8/3/2020:  CT abd/p with contrast negative for recurrence.   No labs done    Family and Social history reviewed and is unchanged from 1/25/2017.       ROS:  Review of Systems   Constitutional:  Negative for fever and unexpected weight change.   HENT:  Negative for nosebleeds.    Respiratory:  Negative for chest tightness and shortness of breath.    Cardiovascular:  Negative for chest pain.   Gastrointestinal:  Negative for abdominal pain and blood in stool.   Genitourinary:  Negative for hematuria.   Skin:  Negative for rash.   Hematological:  Does not bruise/bleed easily.        Current Outpatient Medications:     acyclovir (ZOVIRAX) 800 MG Tab, Take 1 tablet (800 mg total) by mouth 5 (five) times daily. for 7 days, Disp: 35 tablet, Rfl: 0    amlodipine (NORVASC) 10 MG tablet, , Disp: , Rfl:     aspirin 81 MG Chew, CHEW ONE TABLET BY MOUTH DAILY TO PREVENT BLOOD CLOTS, Disp: , Rfl:     cloNIDine (CATAPRES) 0.2 MG tablet, TAKE ONE TABLET BY MOUTH TWICE A DAY FOR HEART AND BLOOD PRESSURE, Disp: , Rfl:     cyclobenzaprine (FLEXERIL) 10 MG tablet, TAKE ONE TABLET BY MOUTH TWICE A DAY AS NEEDED AS A MUSCLE RELAXANT, Disp:  , Rfl:     DULoxetine (CYMBALTA) 30 MG capsule, TAKE THREE CAPSULES BY MOUTH DAILY FOR DEPRESSION AND/OR PAIN, Disp: , Rfl:     finasteride (PROSCAR) 5 mg tablet, , Disp: , Rfl:     gabapentin (NEURONTIN) 600 MG tablet, Take 600 mg by mouth 3 (three) times daily., Disp: , Rfl:     ibuprofen (ADVIL,MOTRIN) 800 MG tablet, Take 1 tablet (800 mg total) by mouth every 6 (six) hours as needed for Pain., Disp: 20 tablet, Rfl: 0    lactobacillus acidophilus & bulgar (LACTINEX) 100 million cell packet, Take 1 packet (1 each total) by mouth 2 (two) times daily., Disp: , Rfl:     lidocaine (LIDODERM) 5 %, APPLY 1 PATCH TOPICALLY DAILY AS NEEDED FOR PAIN, Disp: , Rfl:     metFORMIN (GLUCOPHAGE) 1000 MG tablet, TAKE ONE-HALF TABLET BY MOUTH DAILY FOR DIABETES, Disp: , Rfl:     omeprazole (PRILOSEC) 20 MG capsule, Take 20 mg by mouth once daily. , Disp: , Rfl:     simvastatin (ZOCOR) 40 MG tablet, , Disp: , Rfl:     tamsulosin (FLOMAX) 0.4 mg Cp24, Take 0.4 mg by mouth once daily. , Disp: , Rfl:     venlafaxine (EFFEXOR-XR) 75 MG 24 hr capsule, , Disp: , Rfl:         Objective:       Physical Examination:     /78   Pulse 86   Temp 97.9 °F (36.6 °C)   Wt (!) 140.3 kg (309 lb 4.8 oz)   BMI 41.95 kg/m²     Physical Exam  Vitals reviewed.   Constitutional:       Appearance: Normal appearance. He is well-developed.   HENT:      Head: Normocephalic and atraumatic.      Right Ear: External ear normal.      Left Ear: External ear normal.   Eyes:      General: No scleral icterus.     Conjunctiva/sclera: Conjunctivae normal.      Pupils: Pupils are equal, round, and reactive to light.   Cardiovascular:      Rate and Rhythm: Normal rate and regular rhythm.      Heart sounds: Normal heart sounds. No murmur heard.    No friction rub. No gallop.   Pulmonary:      Effort: Pulmonary effort is normal. No respiratory distress.      Breath sounds: Normal breath sounds. No rales.   Chest:      Chest wall: No tenderness.   Abdominal:       General: Bowel sounds are normal. There is no distension.      Palpations: Abdomen is soft. There is no mass.      Tenderness: There is no abdominal tenderness. There is no guarding or rebound.   Musculoskeletal:      Cervical back: Normal range of motion and neck supple.   Lymphadenopathy:      Head:      Right side of head: No tonsillar adenopathy.      Left side of head: No tonsillar adenopathy.      Cervical: No cervical adenopathy.      Upper Body:      Right upper body: No supraclavicular adenopathy.      Left upper body: No supraclavicular adenopathy.   Skin:     General: Skin is warm and dry.   Neurological:      Mental Status: He is alert and oriented to person, place, and time.   Psychiatric:         Mood and Affect: Mood normal.         Behavior: Behavior normal.         Thought Content: Thought content normal.         Judgment: Judgment normal.       Labs:   Recent Results (from the past 336 hour(s))   CBC Auto Differential    Collection Time: 09/26/22  2:23 PM   Result Value Ref Range    WBC 7.00 3.90 - 12.70 K/uL    Hemoglobin 13.0 (L) 14.0 - 18.0 g/dL    Hematocrit 38.8 (L) 40.0 - 54.0 %    Platelets 204 150 - 450 K/uL     CMP  Sodium   Date Value Ref Range Status   09/26/2022 138 136 - 145 mmol/L Final   07/22/2019 142 134 - 144 mmol/L      Potassium   Date Value Ref Range Status   09/26/2022 3.2 (L) 3.5 - 5.1 mmol/L Final     Chloride   Date Value Ref Range Status   09/26/2022 101 95 - 110 mmol/L Final   07/22/2019 107 98 - 110 mmol/L      CO2   Date Value Ref Range Status   09/26/2022 33 (H) 23 - 29 mmol/L Final     Glucose   Date Value Ref Range Status   09/26/2022 101 70 - 110 mg/dL Final   07/22/2019 101 (H) 70 - 99 mg/dL      BUN   Date Value Ref Range Status   09/26/2022 15 6 - 20 mg/dL Final     Creatinine   Date Value Ref Range Status   09/26/2022 0.9 0.5 - 1.4 mg/dL Final   07/22/2019 0.86 0.60 - 1.40 mg/dL      Calcium   Date Value Ref Range Status   09/26/2022 9.1 8.7 - 10.5 mg/dL Final      Total Protein   Date Value Ref Range Status   09/26/2022 7.7 6.0 - 8.4 g/dL Final     Albumin   Date Value Ref Range Status   09/26/2022 4.4 3.5 - 5.2 g/dL Final   07/22/2019 4.4 3.1 - 4.7 g/dL      Total Bilirubin   Date Value Ref Range Status   09/26/2022 0.8 0.1 - 1.0 mg/dL Final     Comment:     For infants and newborns, interpretation of results should be based  on gestational age, weight and in agreement with clinical  observations.    Premature Infant recommended reference ranges:  Up to 24 hours.............<8.0 mg/dL  Up to 48 hours............<12.0 mg/dL  3-5 days..................<15.0 mg/dL  6-29 days.................<15.0 mg/dL       Alkaline Phosphatase   Date Value Ref Range Status   09/26/2022 82 55 - 135 U/L Final     AST   Date Value Ref Range Status   09/26/2022 21 10 - 40 U/L Final     ALT   Date Value Ref Range Status   09/26/2022 31 10 - 44 U/L Final     Anion Gap   Date Value Ref Range Status   09/26/2022 4 (L) 8 - 16 mmol/L Final     eGFR if    Date Value Ref Range Status   01/31/2022 >60.0 >60 mL/min/1.73 m^2 Final     eGFR if non    Date Value Ref Range Status   01/31/2022 >60.0 >60 mL/min/1.73 m^2 Final     Comment:     Calculation used to obtain the estimated glomerular filtration  rate (eGFR) is the CKD-EPI equation.        Lab Results   Component Value Date    CEA 2.6 09/26/2022     No results found for: PSA      Assessment/Plan:     Problem List Items Addressed This Visit          Oncology    Malignant neoplasm of sigmoid colon - Primary (Chronic)    Relevant Orders    CT Chest Abdomen Pelvis W W/O Contrast (XPD)    CBC Auto Differential    CMP    CEA     Colon Cancer- CT and Labs in 6 mths      Follow up in about 6 months (around 3/27/2023) for with Dr. Rainey.      Electronically signed by Idania Klein, MSN, APRN, AGNP-C, OCN

## 2023-02-24 ENCOUNTER — HOSPITAL ENCOUNTER (OUTPATIENT)
Dept: RADIOLOGY | Facility: HOSPITAL | Age: 58
Discharge: HOME | End: 2023-02-24
Attending: NURSE PRACTITIONER
Payer: OTHER GOVERNMENT

## 2023-02-24 DIAGNOSIS — C18.7 MALIGNANT NEOPLASM OF SIGMOID COLON: ICD-10-CM

## 2023-02-24 LAB
CREAT SERPL-MCNC: 1 MG/DL (ref 0.5–1.4)
SAMPLE: NORMAL

## 2023-02-24 PROCEDURE — 71260 CT THORAX DX C+: CPT | Mod: TC,PO

## 2023-02-24 PROCEDURE — 82565 ASSAY OF CREATININE: CPT | Mod: PO

## 2023-02-24 PROCEDURE — 25500020 PHARM REV CODE 255: Mod: PO | Performed by: NURSE PRACTITIONER

## 2023-02-24 RX ADMIN — IOHEXOL 100 ML: 350 INJECTION, SOLUTION INTRAVENOUS at 12:02

## 2024-09-30 ENCOUNTER — TELEPHONE (OUTPATIENT)
Dept: NEUROSURGERY | Facility: CLINIC | Age: 59
End: 2024-09-30

## 2024-10-22 ENCOUNTER — OFFICE VISIT (OUTPATIENT)
Dept: NEUROSURGERY | Facility: CLINIC | Age: 59
End: 2024-10-22
Payer: OTHER GOVERNMENT

## 2024-10-22 ENCOUNTER — TELEPHONE (OUTPATIENT)
Dept: NEUROSURGERY | Facility: CLINIC | Age: 59
End: 2024-10-22

## 2024-10-22 VITALS
BODY MASS INDEX: 42.66 KG/M2 | OXYGEN SATURATION: 98 % | HEART RATE: 92 BPM | HEIGHT: 72 IN | WEIGHT: 315 LBS | SYSTOLIC BLOOD PRESSURE: 137 MMHG | DIASTOLIC BLOOD PRESSURE: 89 MMHG

## 2024-10-22 DIAGNOSIS — M54.41 CHRONIC BILATERAL LOW BACK PAIN WITH BILATERAL SCIATICA: Primary | ICD-10-CM

## 2024-10-22 DIAGNOSIS — M54.42 CHRONIC BILATERAL LOW BACK PAIN WITH BILATERAL SCIATICA: Primary | ICD-10-CM

## 2024-10-22 DIAGNOSIS — G89.29 CHRONIC BILATERAL LOW BACK PAIN WITH BILATERAL SCIATICA: Primary | ICD-10-CM

## 2024-10-22 DIAGNOSIS — M48.061 SPINAL STENOSIS OF LUMBAR REGION, UNSPECIFIED WHETHER NEUROGENIC CLAUDICATION PRESENT: ICD-10-CM

## 2024-10-22 PROBLEM — R65.20 SEVERE SEPSIS: Status: RESOLVED | Noted: 2017-05-10 | Resolved: 2024-10-22

## 2024-10-22 PROBLEM — A41.9 SEVERE SEPSIS: Status: RESOLVED | Noted: 2017-05-10 | Resolved: 2024-10-22

## 2024-10-22 PROBLEM — E66.01 MORBID OBESITY WITH BMI OF 40.0-44.9, ADULT: Status: ACTIVE | Noted: 2017-05-10

## 2024-10-22 PROBLEM — E66.9 OBESITY WITH BODY MASS INDEX 30 OR GREATER: Status: RESOLVED | Noted: 2017-05-17 | Resolved: 2024-10-22

## 2024-10-22 PROCEDURE — 99205 OFFICE O/P NEW HI 60 MIN: CPT | Mod: S$GLB,,, | Performed by: PHYSICIAN ASSISTANT

## 2024-10-22 RX ORDER — METHOCARBAMOL 750 MG/1
750 TABLET, FILM COATED ORAL
COMMUNITY
Start: 2024-09-30

## 2024-10-22 NOTE — PROGRESS NOTES
[facility]  Neurosurgery    SUBJECTIVE:     History of Present Illness:  Faisal Aguirre III is a 59 y.o. 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  [facility] and [facility] in the past for this complaint.  For various reasons, he was not had surgery as requested.  He was a h/o lumbar laminectomy at an unknown level in 2003 by a surgeon at [facility].  His current symptoms have been present since at least 2016.  Complains of constant back pain with pain radiating down the backs of both legs.  He feels like he was being stabbed in his b/l buttocks and pulled upward.  He was unable to walk or stand for any significant amount of time without the pain becoming severe.  He states he can not walk his dog 3 houses down without needing to stop and rest.  He was unable to complete a grocery shopping trip without stopping.    Denies b/b dysfunction and saddle anesthesia  Endorses chronic numbness in his thumb and index finger on the right hand due to trauma   Endorses chronic numbness in his b/l shins and feet due to neuropathy related to prior chemo treatments    Treatments tried:  -PT:  Has not tried in > 2 years and had no relief in the past  -JEREMIAH:  Has not tried in > 2 years and had no relief in the past  -Gabapentin:  not taking  -Muscle relaxer:  Flexeril nightly and Robaxin during the day  -Rx pain medications:  No current narcotic pain medications on his   -Spine surgery:  Lumbar laminectomy in 2003; ACDF in 2010    Blood thinners:  Aspirin   h/o PE in 2017  H/o colon cancer in 2016  Current smoker    (Not in a hospital admission)      Review of patient's allergies indicates:   Allergen Reactions    Pcn [penicillins] Anaphylaxis       Past Medical History:   Diagnosis Date    Cancer     colon    Colon cancer     Depression     Hypertension       Past Surgical History:   Procedure Laterality Date    BACK SURGERY      lower back and neck surgery    COLON SURGERY      HAND SURGERY      NECK SURGERY       No family history on file.  Social History     Tobacco Use    Smoking status: Every Day     Types: Vaping w/o nicotine    Smokeless tobacco: Never   Substance Use Topics    Alcohol use: Yes     Comment: Ocassional    Drug use: Yes     Types: Marijuana        Review of Systems:  As noted in HPI    OBJECTIVE:     Vital Signs (Most Recent):  Vitals:    10/22/24 1355   BP: 137/89   Pulse: 92   SpO2: 98%   Weight: (!) 147.1 kg (324 lb 4.8 oz)   Height: 6' (1.829 m)   PainSc: 10-Worst pain ever   PainLoc: Back     Body mass index is 43.98 kg/m².      Physical Exam:  General: well developed, well nourished, no distress  Head: normocephalic, atraumatic  Neurologic: Alert and oriented. Thought content appropriate  GCS: Motor: 6/Verbal: 5/Eyes: 4 GCS Total: 15  Language: No aphasia  Speech: No dysarthria  Cranial nerves: face symmetric, tongue midline, CN II-XII grossly intact.   Eyes: pupils equal, round, reactive to light with accommodation, EOMI.   Pulmonary: normal respirations, not labored, no accessory muscles used    Sensory:  Decreased sensation to right thumb and index finger as well as b/l legs from the mid shin down    Motor Strength: Moves all extremities spontaneously with good tone.  Full strength upper and lower extremities. No abnormal movements seen.     Strength  Deltoids Triceps Biceps   Hand  FA   Upper: R 5/5 5/5 5/5   5/5 5/5    L 5/5 5/5 5/5   5/5 5/5     Iliopsoas Quadriceps  Tibialis  anterior Gastro- cnemius EHL    Lower: R 5/5 5/5  5/5 5/5 5/5     L 5/5 5/5  5/5 5/5 5/5      Tyson: absent  Clonus: absent    Gait: normal    Midline Bony Tenderness:  Very tender beginning in the midthoracic spine.  Patient visibly uncomfortable and requesting that the remainder of the spine not be palpated    Diagnostic Results:  I have personally  reviewed imaging and agree with the findings.     MRI lumbar spine 05/01/2024:   Disc extrusion at L4-5 causing moderate central stenosis   Left lateral recess stenosis at L5-S1   Muscle atrophy on the left in the lower lumbar spine    CT lumbar spine 05/01/2024   Bony foraminal stenosis bilaterally at L5-S1        ASSESSMENT/PLAN:     Faisal Aguirre III is a 59 y.o. male who presents with chronic back and leg pain suggestive neurogenic claudication plus/minus and S1 radiculopathy.  MRI reveals moderate stenosis due to a disc extrusion at L4-5, but CSF is still visible in the central canal.  This disc is new compared to his 2022 MRI.  He also has bony foraminal stenosis bilaterally at L5-S1 on a recent CT.  He was grossly intact on exam with the exception of chronic numbness as noted in the HPI.  I recommend a full course of physical therapy before consideration of surgery.  Ideally I would also recommend a new JEREMIAH, but the patient is open the against this concept.      He does not feel like he would be able to participate in PT but agrees reluctantly to try aquatherapy.  He is quite upset that we are not scheduling a surgery today, but I explained that this would not be an option for him for the following reasons:  -he was not tried any recent conservative management not to mention that the disc herniation at L4-5 produces moderate as opposed to severe central stenosis  -bony foraminal stenosis at L5-S1 could only be addressed with a lumbar fusion.  His BMI is above the acceptable threshold for a lumbar fusion    Please feel free to call with any further questions      Romelia Tello PA-C  [facility]  Department of Neurosurgery  [phone]    Disclaimer: This note was dictated by speech recognition. Minor errors in transcription may be present.  Please call with any questions.

## 2024-10-24 ENCOUNTER — PATIENT MESSAGE (OUTPATIENT)
Dept: NEUROSURGERY | Facility: CLINIC | Age: 59
End: 2024-10-24
Payer: OTHER GOVERNMENT

## 2025-01-22 ENCOUNTER — PATIENT MESSAGE (OUTPATIENT)
Dept: NEUROSURGERY | Facility: CLINIC | Age: 60
End: 2025-01-22
Payer: OTHER GOVERNMENT

## 2025-02-10 NOTE — PROGRESS NOTES
[facility]  Neurosurgery    SUBJECTIVE:     Interval history 02/11/2025:   Patient returns to clinic for follow up of chronic back and leg pain.  He was referred to aquatherapy at his last visit in today reports that the visits still have not begun.  He continues to have persistent and severe low back pain with radiation into his b/l buttocks.  He feels like he is being stabbed in the butt cheeks when he tries to walk.      History of Present Illness 10/22/2024:  Faisal Aguirre III is a 59 y.o. 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  [facility] and [facility] in the past for this complaint.  For various reasons, he was not had surgery as requested.  He was a h/o lumbar laminectomy at an unknown level in 2003 by a surgeon at [facility].  His current symptoms have been present since at least 2016.  Complains of constant back pain with pain radiating down the backs of both legs.  He feels like he was being stabbed in his b/l buttocks and pulled upward.  He was unable to walk or stand for any significant amount of time without the pain becoming severe.  He states he can not walk his dog 3 houses down without needing to stop and rest.  He was unable to complete a grocery shopping trip without stopping.    Denies b/b dysfunction and saddle anesthesia  Endorses chronic numbness in his thumb and index finger on the right hand due to trauma   Endorses chronic numbness in his b/l shins and feet due to neuropathy related to prior chemo treatments    Treatments tried as of 10/22/2024:  -PT:  Has not tried in > 2 years and had no relief in the past  -JEREMIAH:  Has not tried in > 2 years and had no relief in the past  -Gabapentin:  not taking  -Muscle relaxer:  Flexeril nightly and Robaxin during the day  -Rx pain medications:  No current narcotic pain  medications on his   -Spine surgery:  Lumbar laminectomy in 2003; ACDF in 2010    Blood thinners:  Aspirin   h/o PE in 2017  H/o colon cancer in 2016  Current smoker    (Not in a hospital admission)      Review of patient's allergies indicates:   Allergen Reactions    Pcn [penicillins] Anaphylaxis       Past Medical History:   Diagnosis Date    Cancer     colon    Colon cancer     Depression     Hypertension      Past Surgical History:   Procedure Laterality Date    BACK SURGERY      lower back and neck surgery    COLON SURGERY      HAND SURGERY      NECK SURGERY       No family history on file.  Social History     Tobacco Use    Smoking status: Every Day     Types: Vaping w/o nicotine, Cigarettes    Smokeless tobacco: Never   Substance Use Topics    Alcohol use: Yes     Comment: Ocassional    Drug use: Yes     Types: Marijuana        Review of Systems:  As noted in HPI    OBJECTIVE:     Vital Signs (Most Recent):  Vitals:    02/11/25 1250   BP: 132/85   Pulse: 70   SpO2: 97%   Weight: (!) 144.6 kg (318 lb 12.6 oz)   Height: 6' (1.829 m)   PainSc:   9   PainLoc: Back       Body mass index is 43.24 kg/m².      Physical Exam:  General: well developed, well nourished, no distress  Head: normocephalic, atraumatic  Neurologic: Alert and oriented. Thought content appropriate  GCS: Motor: 6/Verbal: 5/Eyes: 4 GCS Total: 15  Language: No aphasia  Speech: No dysarthria  Cranial nerves: face symmetric, tongue midline, CN II-XII grossly intact.   Eyes: pupils equal, round, reactive to light with accommodation, EOMI.   Pulmonary:  Labored respirations    Sensory:  Decreased sensation to b/l legs from the mid shin down    Motor Strength: Moves all extremities spontaneously with good tone.  Full strength lower extremities.     Strength  Iliopsoas Quadriceps  Tibialis  anterior Gastro- cnemius   Lower: R 5/5 5/5  5/5 5/5    L 5/5 5/5  5/5 5/5     Tyson: absent  Clonus: absent    Gait: normal    Midline Bony Tenderness:  Very  tender beginning in the midthoracic spine.  Patient visibly uncomfortable and requesting that the remainder of the spine not be palpated  SI joint tenderness: Positive bilaterally    Diagnostic Results:  I have personally reviewed imaging and agree with the findings.     MRI lumbar spine 05/01/2024:   Disc extrusion at L4-5 causing moderate central stenosis   Left lateral recess stenosis at L5-S1   Muscle atrophy on the left in the lower lumbar spine    CT lumbar spine 05/01/2024   Bony foraminal stenosis bilaterally at L5-S1      ASSESSMENT/PLAN:     Faisal Aguirre III is a 59 y.o. male who presents for follow up of chronic back and leg pain of suggestive neurogenic claudication +/- and S1 radiculopathy.  MRI reveals moderate stenosis due to a disc extrusion at L4-5, but CSF is still visible in the central canal.  This disc is new compared to his 2022 MRI.  He also has bony foraminal stenosis bilaterally at L5-S1 on a recent CT.  He was grossly intact on exam with the exception of chronic numbness as noted in the HPI.  New today is b/l SI joint tenderness on exam.      I recommend he proceed with a aquatherapy as previously ordered.  He expressed frustration in the scheduling of therapy.  I advised him to call local therapy centers near his home to inquire about insurance coverage.  Alternatively, he could call his insurance company to find local providers.    b/l SI joint injections ordered through pain management in [place].    Follow up in 4 months after completion of the above  If surgery is to be considered, we will order an updated lumbar MRI      Please feel free to call with any further questions      Romelia Tello PA-C  [facility]  Department of Neurosurgery  [phone]    Disclaimer: This note was dictated by speech recognition. Minor errors in transcription may be present.  Please call with any questions.

## 2025-02-11 ENCOUNTER — OFFICE VISIT (OUTPATIENT)
Dept: NEUROSURGERY | Facility: CLINIC | Age: 60
End: 2025-02-11
Payer: OTHER GOVERNMENT

## 2025-02-11 ENCOUNTER — TELEPHONE (OUTPATIENT)
Dept: PAIN MEDICINE | Facility: CLINIC | Age: 60
End: 2025-02-11
Payer: OTHER GOVERNMENT

## 2025-02-11 VITALS
HEART RATE: 70 BPM | OXYGEN SATURATION: 97 % | BODY MASS INDEX: 42.66 KG/M2 | WEIGHT: 315 LBS | HEIGHT: 72 IN | SYSTOLIC BLOOD PRESSURE: 132 MMHG | DIASTOLIC BLOOD PRESSURE: 85 MMHG

## 2025-02-11 DIAGNOSIS — M46.1 BILATERAL SACROILIITIS: Primary | ICD-10-CM

## 2025-02-11 PROCEDURE — 99214 OFFICE O/P EST MOD 30 MIN: CPT | Mod: S$GLB,,, | Performed by: PHYSICIAN ASSISTANT

## 2025-02-11 NOTE — TELEPHONE ENCOUNTER
ypes of orders made on 02/11/2025: Procedure Request      Order Date:2/11/2025   Ordering User:ROMELIA TELLO [[id number]]   Encounter Provider:Romelia Tello PA-C [[id number]]   Authorizing Provider: Romelia Tello PA-C [[id number]]   Supervising Provider:JIL WILHELM [[id number]]   Type of Supervision:Supervision Required   Department:[facility] NEUROSURGERY[[id number]]      Common Order Information   Procedure -> Sacroiliac Injection (Specify laterality) Cmt: bilateral      Pre-op Diagnosis -> SI (sacroiliac) joint inflammation      Order Specific Information   Order: Procedure Order to Pain Management [Custom: [id number]]  Order #:          [id number]: [id number] FUTURE     Priority: Routine  Class: Clinic Performed     Future Order Information       Expires on:02/11/2026            Expected by:02/11/2025                   Associated Diagnoses       M46.1 Bilateral sacroiliitis       Is patient on anti-coagulants? -> Yes Cmt: ASA          Facility Name: -> [facility]              Priority: Routine  Class: Clinic Performed     Future Order Information       Expires on:02/11/2026            Expected by:02/11/2025                   Associated Diagnoses       M46.1 Bilateral sacroiliitis       Procedure -> Sacroiliac Injection (Specify laterality) Cmt: bilateral          Is patient on anti-coagulants? -> Yes Cmt: ASA

## 2025-02-20 ENCOUNTER — TELEPHONE (OUTPATIENT)
Dept: PAIN MEDICINE | Facility: CLINIC | Age: 60
End: 2025-02-20
Payer: OTHER GOVERNMENT

## 2025-02-20 DIAGNOSIS — M46.1 SACROILIITIS: Primary | ICD-10-CM

## 2025-02-20 NOTE — TELEPHONE ENCOUNTER
----- Message from Benji sent at 2/20/2025  9:28 AM CST -----  Regarding: return call  Contact: patient  Type:  Patient Returning CallWho Called:patientWho Left Message for Patient:Nubia Duffy the patient know what this is regarding?:your call from last weekWould the patient rather a call back or a response via [facility]? Please call [facility] Call Back Number:[phone] Information:

## 2025-02-20 NOTE — TELEPHONE ENCOUNTER
Spoke with pt and scheduled for SIJ for 03/07 instructions given we need a [facility] referral form either way for authorization

## 2025-04-11 ENCOUNTER — PATIENT MESSAGE (OUTPATIENT)
Dept: NEUROSURGERY | Facility: CLINIC | Age: 60
End: 2025-04-11
Payer: OTHER GOVERNMENT

## 2025-05-12 ENCOUNTER — PATIENT MESSAGE (OUTPATIENT)
Dept: NEUROSURGERY | Facility: CLINIC | Age: 60
End: 2025-05-12
Payer: OTHER GOVERNMENT

## 2025-06-12 ENCOUNTER — TELEPHONE (OUTPATIENT)
Dept: PAIN MEDICINE | Facility: CLINIC | Age: 60
End: 2025-06-12
Payer: OTHER GOVERNMENT

## 2025-06-12 ENCOUNTER — OFFICE VISIT (OUTPATIENT)
Dept: NEUROSURGERY | Facility: CLINIC | Age: 60
End: 2025-06-12
Payer: OTHER GOVERNMENT

## 2025-06-12 VITALS
SYSTOLIC BLOOD PRESSURE: 125 MMHG | DIASTOLIC BLOOD PRESSURE: 81 MMHG | BODY MASS INDEX: 41.06 KG/M2 | WEIGHT: 303.13 LBS | HEART RATE: 67 BPM | OXYGEN SATURATION: 100 % | HEIGHT: 72 IN

## 2025-06-12 DIAGNOSIS — M46.1 BILATERAL SACROILIITIS: Primary | ICD-10-CM

## 2025-06-12 DIAGNOSIS — M46.1 SACROILIITIS: Primary | ICD-10-CM

## 2025-06-12 PROCEDURE — 99213 OFFICE O/P EST LOW 20 MIN: CPT | Mod: S$GLB,,, | Performed by: PHYSICIAN ASSISTANT

## 2025-06-12 NOTE — TELEPHONE ENCOUNTER
Types of orders made on 06/12/2025: Procedure Request      Order Date:6/12/2025   Ordering User:ROMELIA TELLO [[id number]]   Encounter Provider:Romelia Tello PA-C [[id number]]   Authorizing Provider: Romelia Tello PA-C [[id number]]   Supervising Provider:JIL WILHELM [[id number]]   Type of Supervision:Supervision Required   Department:[facility] NEUROSURGERY[[id number]]      Common Order Information   Procedure -> Sacroiliac Injection (Specify laterality) Cmt: bilateral SI joint             injections      Order Specific Information   Order: Procedure Request Order for Pain Management [Custom: [id number]]  Order #:          [id number]: 1 FUTURE     Priority: Routine  Class: Clinic Performed     Future Order Information       Expires on:06/12/2026            Expected by:06/12/2025                   Associated Diagnoses       M46.1 Bilateral sacroiliitis       Facility Name: -> [facility]              Priority: Routine  Class: Clinic Performed     Future Order Information       Expires on:06/12/2026            Expected by:06/12/2025                   Associated Diagnoses       M46.1 Bilateral sacroiliitis       Procedure -> Sacroiliac Injection (Specify laterality) Cmt: bilateral SI                 joint injections   Ok to schedule  ?

## 2025-06-12 NOTE — PROGRESS NOTES
[facility]  Neurosurgery    SUBJECTIVE:     Interval history 06/12/2025:   Patient returns to clinic for follow up of chronic pains in his neck, back, and legs.  Neck and back pain is not overly bothersome as he feels he is used to this chronic pain.  More concerning is the continued pain in his b/l buttocks and posterior legs.  He feels that he has a dagger in both butt cheeks pulling up.  He was referred to PT and b/l SI joint injections were also ordered at his last visit.  Unfortunately, his insurance was not able to approve the injections, and he delayed PT while waiting for the injection.      Interval history 02/11/2025:   Patient returns to clinic for follow up of chronic back and leg pain.  He was referred to aquatherapy at his last visit in today reports that the visits still have not begun.  He continues to have persistent and severe low back pain with radiation into his b/l buttocks.  He feels like he is being stabbed in the butt cheeks when he tries to walk.      History of Present Illness 10/22/2024:  Faisal Aguirre III is a 59 y.o. 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  [facility] and has [facility] multiple spine surgeons in the past for this complaint.  For various reasons, he was not had surgery as requested.  He was a h/o lumbar laminectomy at an unknown level in 2003 by a surgeon at [facility].  His current symptoms have been present since at least 2016.  Complains of constant back pain with pain radiating down the backs of both legs.  He feels like he was being stabbed in his b/l buttocks and pulled upward.  He was unable to walk or stand for any significant amount of time without the pain becoming severe.  He states he can not walk his dog 3 houses down without needing to stop and rest.  He was unable to complete a grocery shopping trip without  stopping.    Denies b/b dysfunction and saddle anesthesia  Endorses chronic numbness in his thumb and index finger on the right hand due to trauma   Endorses chronic numbness in his b/l shins and feet due to neuropathy related to prior chemo treatments    Treatments tried as of 10/22/2024:  -PT:  Has not tried in > 2 years and had no relief in the past  -JEREMIAH:  Has not tried in > 2 years and had no relief in the past  -Gabapentin:  not taking  -Muscle relaxer:  Flexeril nightly and Robaxin during the day  -Rx pain medications:  No current narcotic pain medications on his   -Spine surgery:  Lumbar laminectomy in 2003; ACDF in 2010    Blood thinners:  Aspirin   h/o PE in 2017  H/o colon cancer in 2016  Current smoker    (Not in a hospital admission)      Review of patient's allergies indicates:   Allergen Reactions    Pcn [penicillins] Anaphylaxis       Past Medical History:   Diagnosis Date    Cancer     colon    Colon cancer     Depression     Hypertension      Past Surgical History:   Procedure Laterality Date    BACK SURGERY      lower back and neck surgery    COLON SURGERY      HAND SURGERY      NECK SURGERY       No family history on file.  Social History     Tobacco Use    Smoking status: Every Day     Types: Vaping w/o nicotine, Cigarettes    Smokeless tobacco: Never   Substance Use Topics    Alcohol use: Yes     Comment: Ocassional    Drug use: Yes     Types: Marijuana        Review of Systems:  As noted in HPI    OBJECTIVE:     Vital Signs (Most Recent):  Vitals:    06/12/25 1323   BP: 125/81   Pulse: 67   SpO2: 100%   Weight: (!) 137.5 kg (303 lb 2.1 oz)   Height: 6' (1.829 m)   PainSc:   8   PainLoc: Back       Body mass index is 41.11 kg/m².      Physical Exam:  General: well developed, well nourished, no distress  Head: normocephalic, atraumatic  Neurologic: Alert and oriented. Thought content appropriate  GCS: Motor: 6/Verbal: 5/Eyes: 4 GCS Total: 15  Language: No aphasia  Speech: No  dysarthria  Cranial nerves: face symmetric, tongue midline, CN II-XII grossly intact.   Eyes: pupils equal, round, reactive to light with accommodation, EOMI.   Pulmonary:  Labored respirations    Sensory:  Intact to light touch throughout BLE    Motor Strength: Moves all extremities spontaneously with good tone.  Full strength lower extremities.     Strength  Iliopsoas Quadriceps EHL Tibialis  anterior Gastro- cnemius   Lower: R 5/5 5/5 5/5 5/5 5/5    L 5/5 5/5 5/5 5/5 5/5     Clonus: absent    Gait: normal    Midline Bony Tenderness:  Very tender beginning in the midthoracic spine.   SI joint tenderness: Positive bilaterally    Diagnostic Results:  I have personally reviewed imaging and agree with the findings.     MRI lumbar spine 05/01/2024:   Disc extrusion at L4-5 causing moderate central stenosis   Left lateral recess stenosis at L5-S1   Muscle atrophy on the left in the lower lumbar spine    CT lumbar spine 05/01/2024   Bony foraminal stenosis bilaterally at L5-S1      ASSESSMENT/PLAN:     Faisal Aguirre III is a 59 y.o. male who presents for follow up of chronic back and leg pain suggestive of neurogenic claudication +/- and S1 radiculopathy.  MRI reveals moderate stenosis due to a disc extrusion at L4-5, but CSF is still visible in the central canal.  This disc is new compared to his 2022 MRI.  He also has bony foraminal stenosis bilaterally at L5-S1 on a recent CT.  He was grossly intact on exam with the exception of chronic numbness as noted in the original HPI.  He continues to have severe midline bony tenderness and severe b/l SI joint tenderness     SI injections reordered through pain management and [place]   He understands that [facility].  It seems we are still waiting on the lumbar MRI report.  He will e-mail this report via [employer]  I recommend he start physical therapy, as the injections may take a long time to complete    Follow up to be determined  by scheduled injection date    Please feel free to call with any further questions      Romelia Tello PA-C  [facility]  Department of Neurosurgery  [phone]    Disclaimer: This note was dictated by speech recognition. Minor errors in transcription may be present.  Please call with any questions.